# Patient Record
Sex: MALE | Race: WHITE | NOT HISPANIC OR LATINO | Employment: OTHER | URBAN - NONMETROPOLITAN AREA
[De-identification: names, ages, dates, MRNs, and addresses within clinical notes are randomized per-mention and may not be internally consistent; named-entity substitution may affect disease eponyms.]

---

## 2017-01-04 ENCOUNTER — OFFICE VISIT (OUTPATIENT)
Dept: ORTHOPEDIC SURGERY | Facility: CLINIC | Age: 67
End: 2017-01-04

## 2017-01-04 VITALS — BODY MASS INDEX: 30.31 KG/M2 | HEIGHT: 68 IN | RESPIRATION RATE: 16 BRPM | WEIGHT: 200 LBS

## 2017-01-04 DIAGNOSIS — Z87.39 HISTORY OF CALCIUM PYROPHOSPHATE DEPOSITION DISEASE (CPPD): ICD-10-CM

## 2017-01-04 DIAGNOSIS — M17.11 PRIMARY OSTEOARTHRITIS OF RIGHT KNEE: Primary | ICD-10-CM

## 2017-01-04 DIAGNOSIS — M11.261 PSEUDOGOUT OF KNEE, RIGHT: ICD-10-CM

## 2017-01-04 PROCEDURE — 20610 DRAIN/INJ JOINT/BURSA W/O US: CPT | Performed by: ORTHOPAEDIC SURGERY

## 2017-01-04 RX ORDER — METHYLPREDNISOLONE ACETATE 40 MG/ML
40 INJECTION, SUSPENSION INTRA-ARTICULAR; INTRALESIONAL; INTRAMUSCULAR; SOFT TISSUE
Status: COMPLETED | OUTPATIENT
Start: 2017-01-04 | End: 2017-01-04

## 2017-01-04 RX ORDER — LIDOCAINE HYDROCHLORIDE 10 MG/ML
2 INJECTION, SOLUTION INFILTRATION; PERINEURAL
Status: COMPLETED | OUTPATIENT
Start: 2017-01-04 | End: 2017-01-04

## 2017-01-04 RX ADMIN — METHYLPREDNISOLONE ACETATE 40 MG: 40 INJECTION, SUSPENSION INTRA-ARTICULAR; INTRALESIONAL; INTRAMUSCULAR; SOFT TISSUE at 08:43

## 2017-01-04 RX ADMIN — LIDOCAINE HYDROCHLORIDE 2 ML: 10 INJECTION, SOLUTION INFILTRATION; PERINEURAL at 08:43

## 2017-01-04 NOTE — MR AVS SNAPSHOT
Chance Jerry   2017 8:30 AM   Office Visit    Dept Phone:  [phone]   Encounter #:  [id number]    Provider:  Eric Cruz MD   Department:  [facility]                Your Full Care Plan              Your Updated Medication List          This list is accurate as of: 17  9:09 AM.  Always use your most recent med list.                aspirin 81 MG EC tablet       buPROPion  MG 24 hr tablet   Commonly known as:  WELLBUTRIN XL       ibuprofen 200 MG tablet   Commonly known as:  ADVIL,MOTRIN       JANUMET  MG per tablet   Generic drug:  sitaGLIPtin-metFORMIN       LIPITOR PO       LISINOPRIL PO       VITAMIN B-12 PO               We Performed the Following     Large Joint Arthrocentesis       You Were Diagnosed With        Codes Comments    Primary osteoarthritis of right knee    -  Primary ICD-10-CM: M17.11  ICD-9-CM: 715.16     History of calcium pyrophosphate deposition disease (CPPD)     ICD-10-CM: Z87.39  ICD-9-CM: V13.4     Pseudogout of knee, right     ICD-10-CM: M11.861  ICD-9-CM: 275.49, 712.16       Instructions     None    Patient Instructions History      Upcoming Appointments     Visit Type Date Time Department    OFFICE VISIT 2017  8:30 AM [facility]    OFFICE VISIT 2017  8:30 AM [facility]      [email or web address] Signup     [facility] [employer] allows you to send messages to your doctor, view your test results, renew your prescriptions, schedule appointments, and more. To sign up, go to [employer] and click on the Sign Up Now link in the New User? box. Enter your [employer] Activation Code exactly as it appears below along with the last four digits of your Social Security Number and your Date of Birth () to complete the sign-up process. If you do not sign up before the expiration date, you must request a new code.    [employer] Activation Code:  [email or web address]  Expires: 1/18/2017  9:07 AM    If you have questions, you can email [email or web address] or call [phone] to talk to our MyChart staff. Remember, [employer] is NOT to be used for urgent needs. For medical emergencies, dial 911.               Other Info from Your Visit           Your Appointments     Apr 05, 2017  8:30 AM EDT   Office Visit with Eric Cruz MD   [facility] (--)    [address] [place], [place] [address]   [phone]           Arrive 15 minutes prior to appointment.              Allergies     No Known Allergies      Reason for Visit     Left Knee - Follow-up     Right Knee - Follow-up, Pain Requesting cortisone injection for right knee      Vital Signs     Respirations Height Weight Body Mass Index Smoking Status       16 68\" (172.7 cm) 200 lb (90.7 kg) 30.41 kg/m2 Never Smoker       Problems and Diagnoses Noted     Degenerative joint disease of lower leg    -  Primary    History of calcium pyrophosphate deposition disease (CPPD)        Pseudogout of knee, right          Medications Administered     lidocaine (XYLOCAINE) 1 % injection 2 mL                  methylPREDNISolone acetate (DEPO-medrol) injection 40 mg                    Results     Large Joint Arthrocentesis               "

## 2017-04-12 ENCOUNTER — OFFICE VISIT (OUTPATIENT)
Dept: ORTHOPEDIC SURGERY | Facility: CLINIC | Age: 67
End: 2017-04-12

## 2017-04-12 VITALS — RESPIRATION RATE: 18 BRPM | BODY MASS INDEX: 29.86 KG/M2 | HEIGHT: 68 IN | WEIGHT: 197 LBS

## 2017-04-12 DIAGNOSIS — M11.261 PSEUDOGOUT OF KNEE, RIGHT: ICD-10-CM

## 2017-04-12 DIAGNOSIS — M11.262 PSEUDOGOUT OF KNEE, LEFT: ICD-10-CM

## 2017-04-12 DIAGNOSIS — M17.4 OTHER SECONDARY OSTEOARTHRITIS OF BOTH KNEES: Primary | ICD-10-CM

## 2017-04-12 DIAGNOSIS — Z87.39 HISTORY OF CALCIUM PYROPHOSPHATE DEPOSITION DISEASE (CPPD): ICD-10-CM

## 2017-04-12 PROCEDURE — 20610 DRAIN/INJ JOINT/BURSA W/O US: CPT | Performed by: ORTHOPAEDIC SURGERY

## 2017-04-12 RX ORDER — LIDOCAINE HYDROCHLORIDE 10 MG/ML
2 INJECTION, SOLUTION INFILTRATION; PERINEURAL
Status: COMPLETED | OUTPATIENT
Start: 2017-04-12 | End: 2017-04-12

## 2017-04-12 RX ORDER — METHYLPREDNISOLONE ACETATE 40 MG/ML
40 INJECTION, SUSPENSION INTRA-ARTICULAR; INTRALESIONAL; INTRAMUSCULAR; SOFT TISSUE
Status: COMPLETED | OUTPATIENT
Start: 2017-04-12 | End: 2017-04-12

## 2017-04-12 RX ADMIN — LIDOCAINE HYDROCHLORIDE 2 ML: 10 INJECTION, SOLUTION INFILTRATION; PERINEURAL at 14:01

## 2017-04-12 RX ADMIN — METHYLPREDNISOLONE ACETATE 40 MG: 40 INJECTION, SUSPENSION INTRA-ARTICULAR; INTRALESIONAL; INTRAMUSCULAR; SOFT TISSUE at 14:01

## 2017-04-12 NOTE — PROGRESS NOTES
"Subjective   Chance Jerry is a 66 y.o. male is here today for injection therapy.  Pain and Follow-up of the Left Knee and Pain and Follow-up of the Right Knee      History of Present Illness     Past Medical History:   Diagnosis Date   • Diabetes mellitus    • H/O corticosteroid therapy     left knee injection, right shoulder, right knee   • Hypertension    • Osteoarthritis of knee    • Pseudogout of knee         Past Surgical History:   Procedure Laterality Date   • HERNIA REPAIR         No Known Allergies    Review of Systems   Constitutional: Negative for fever.   HENT: Negative for voice change.    Eyes: Negative for visual disturbance.   Respiratory: Negative for shortness of breath.    Cardiovascular: Negative for chest pain.   Gastrointestinal: Negative for abdominal distention and abdominal pain.   Genitourinary: Negative for dysuria.   Musculoskeletal: Positive for arthralgias. Negative for gait problem and joint swelling.   Skin: Negative for rash.   Neurological: Negative for speech difficulty.   Hematological: Does not bruise/bleed easily.   Psychiatric/Behavioral: Negative for confusion.        Objective   Resp 18  Ht 68\" (172.7 cm)  Wt 197 lb (89.4 kg)  BMI 29.95 kg/m2   Physical Exam   Musculoskeletal:        Right knee: He exhibits effusion.   Psychiatric: His speech is normal.     Since last injection, patient notes substantial relief of symptoms until recently.  No adverse effects of prior injection.  Skin exam stable with no erythema, ecchymosis or rash.  No new swelling.  No motor or sensory changes.  Distal pulse intact.  Right Knee Exam     Tenderness   The patient is experiencing tenderness in the medial retinaculum, lateral retinaculum and patella.    Range of Motion   Extension: 5   Flexion: 110     Muscle Strength     The patient has normal right knee strength.    Tests   Abhinav:  Lateral - positive  Drawer:       Anterior - 1+    Posterior - 1+  Varus: positive " (pseudolaxity)  Valgus: negative  Patellar Apprehension: negative    Other   Erythema: absent  Swelling: moderate  Other tests: effusion present      Left Knee Exam     Tenderness   The patient is experiencing no tenderness.         Range of Motion   Extension: 0   Flexion: 130     Muscle Strength     The patient has normal left knee strength.          Neurologic Exam     Mental Status   Attention: normal.   Speech: speech is normal   Level of consciousness: alert  Knowledge: good.     Motor Exam   Overall muscle tone: normal    Gait, Coordination, and Reflexes     Gait  Gait: (right greater than left varus knee gait.)    Left Knee Exam     Muscle Strength   Normal left knee strength    Right Knee Exam     Muscle Strength   Normal right knee strength            Large Joint Arthrocentesis  Date/Time: 4/12/2017 2:01 PM  Consent given by: patient  Site marked: site marked  Timeout: Immediately prior to procedure a time out was called to verify the correct patient, procedure, equipment, support staff and site/side marked as required   Supporting Documentation  Indications: pain   Procedure Details  Location: knee - R knee  Preparation: Patient was prepped and draped in the usual sterile fashion  Needle size: 22 G  Approach: superior  Medications administered: 2 mL lidocaine 1 %; 40 mg methylPREDNISolone acetate 40 MG/ML  Aspirate amount: 40 mL  Aspirate: clear and blood-tinged  Patient tolerance: patient tolerated the procedure well with no immediate complications        Assessment/Plan   Independent Review of Radiographic Studies:    No new imaging done today.  Reviewed at a prior visit.  Laboratory and Other Studies:  No new results reviewed today.   Medical Decision Making:    No complications of procedure and treatments.  Ongoing with residual symptoms.  Continue current management and any additional treatments and workup as outlined in plan.    Chance was seen today for pain, follow-up, pain and  follow-up.    Diagnoses and all orders for this visit:    Other secondary osteoarthritis of both knees  -     Large Joint Arthrocentesis    Pseudogout of knee, right    Pseudogout of knee, left    History of calcium pyrophosphate deposition disease (CPPD)    Other orders  -     Cancel: Large Joint Arthrocentesis      Regular exercise as tolerated  Orthopedic activities reviewed and patient expressed appreciation  Discussion of orthopedic goals  Risk, benefits, and merits of treatment alternatives reviewed with the patient and questions answered  Call or notify for any adverse effect from injection therapy  Ice, heat, and/or modalities as beneficial  Watch for signs and symptoms of infection    Recommendations/Plan:  Exercise, medications, injections, other patient advice, and return appointment as noted.  Brace: No brace was given at today's visit  Referral: No referrals made at today's visit  Test/Studies: No additional studies ordered.  Surgery: No surgery proposed at this visit.  Work/Activity Status: May perform usual activities as tolerated  Patient is encouraged to call or return for any issues or concerns.    Return in about 3 months (around 7/12/2017) for Recheck.  Patient agreeable to call or return sooner for any concerns.

## 2017-07-19 ENCOUNTER — OFFICE VISIT (OUTPATIENT)
Dept: ORTHOPEDIC SURGERY | Facility: CLINIC | Age: 67
End: 2017-07-19

## 2017-07-19 VITALS — RESPIRATION RATE: 18 BRPM | HEIGHT: 68 IN | WEIGHT: 197 LBS | BODY MASS INDEX: 29.86 KG/M2

## 2017-07-19 DIAGNOSIS — M11.20 PSEUDOGOUT: ICD-10-CM

## 2017-07-19 DIAGNOSIS — M25.561 ARTHRALGIA OF BOTH KNEES: ICD-10-CM

## 2017-07-19 DIAGNOSIS — M17.0 PRIMARY OSTEOARTHRITIS OF BOTH KNEES: ICD-10-CM

## 2017-07-19 DIAGNOSIS — M25.562 ARTHRALGIA OF BOTH KNEES: ICD-10-CM

## 2017-07-19 DIAGNOSIS — M19.90 INFLAMMATORY OSTEOARTHRITIS: Primary | ICD-10-CM

## 2017-07-19 PROCEDURE — 73560 X-RAY EXAM OF KNEE 1 OR 2: CPT | Performed by: ORTHOPAEDIC SURGERY

## 2017-07-19 PROCEDURE — 99213 OFFICE O/P EST LOW 20 MIN: CPT | Performed by: ORTHOPAEDIC SURGERY

## 2017-07-24 NOTE — PROGRESS NOTES
Subjective   Patient ID: Chance Jerry is a 66 y.o. male is here today for orthopaedic evaluation of recovery progress with treatment. and is here today for a treatment planning discussion.  Follow-up and Pain of the Left Knee and Follow-up and Pain of the Right Knee       History of Present Illness    Progress Note:  Patient reports that with treatments and since the last visit, overall right knee pain is increased, strength is unchanged, and range of motion is decreasing.  Patient is currently using medication (Ibuprofen).   Physical therapy has been performed at home.  Injection therapy has not recently been effective. as it has been in the past.  Since last visit, patient has been working regular duty. Patient does not  present with recent labs or studies for review.    Past Medical History:   Diagnosis Date   • Diabetes mellitus    • H/O corticosteroid therapy     left knee injection, right shoulder, right knee   • Hypertension    • Osteoarthritis of knee    • Pseudogout of knee         Past Surgical History:   Procedure Laterality Date   • HERNIA REPAIR          Family History   Problem Relation Age of Onset   • Lymphoma Father    • Melanoma Son         Social History     Social History   • Marital status:      Spouse name: N/A   • Number of children: N/A   • Years of education: N/A     Occupational History   • Not on file.     Social History Main Topics   • Smoking status: Never Smoker   • Smokeless tobacco: Not on file   • Alcohol use Yes      Comment: daily   • Drug use: No   • Sexual activity: Defer     Other Topics Concern   • Not on file     Social History Narrative       No Known Allergies    Review of Systems   Constitutional: Negative for fever.   HENT: Negative for voice change.    Eyes: Negative for visual disturbance.   Respiratory: Negative for shortness of breath.    Cardiovascular: Negative for chest pain.   Gastrointestinal: Negative for abdominal distention and abdominal pain.  "  Genitourinary: Negative for dysuria.   Musculoskeletal: Positive for arthralgias. Negative for gait problem and joint swelling.   Skin: Negative for rash.   Neurological: Negative for speech difficulty.   Hematological: Does not bruise/bleed easily.   Psychiatric/Behavioral: Negative for confusion.         Objective   Resp 18  Ht 68\" (172.7 cm)  Wt 197 lb (89.4 kg)  BMI 29.95 kg/m2    Physical Exam  Ortho Exam  Neurologic Exam  Ortho Exam  No acute knee exam changes from recent visit.    Assessment/Plan   Independent Review of Radiographic Studies:    Indication to evaluate right greater than left knee pain and with prior comparison views, shows no acute fracture or disclocation evident.  Indication to evaluate knee joint condition, and compared with prior images, shows evident right more bone-on-bone than left chronic advanced varus tri-compartment osteoarthritis.  Also noted are signs of inflammatory pseudogout or calcium pyrophosphate deposition with stippling calcifications of the meniscal outlines of both knees, and maps out subluxations of both medial menisci medially due the varus compression.  Laboratory and Other Studies:  No new results reviewed today.   Medical Decision Making:    No complications of procedure and treatments.  Limited progress with persistent and/or progressive symptoms.  Continue current management and any additional treatments and workup as outlined in plan.    Procedures  [x]  No procedures were performed in office today.    Chance was seen today for follow-up, pain, follow-up and pain.    Diagnoses and all orders for this visit:    Inflammatory osteoarthritis  -     XR knee 1 or 2 vw bilateral; Future    Pseudogout  -     XR knee 1 or 2 vw bilateral; Future    Primary osteoarthritis of both knees  -     XR knee 1 or 2 vw bilateral; Future    Arthralgia of both knees       Regular exercise as tolerated  Orthopedic activities reviewed and patient expressed appreciation  Discussion " of orthopedic goals  Risk, benefits, and merits of treatment alternatives reviewed with the patient and questions answered  The nature of the proposed surgery reviewed with the patient including risks, benefits, rehabilitation, recovery timeframe, and outcome expectations  Ice, heat, and/or modalities as beneficial  Guided on proper techniques for mobility, strength, agility and/or conditioning exercises    Orthopaedic knee osteoarthritis treatment options reviewed including:  Arthiritis exercises and activity modifications  Knee brace  Medications such as anti-inflammatory, as tolerated, and if no contraindications  Corticosteroid injection were effective though not recently.  Visco supplementation injections though not usually effective for inflammatory knee.  Elective knee arthroscopy or arthroplasty as appropriate  Orthopaedic surgery limitations and risks reviewed    Recommendations/Plan:  Exercise, medications, injections, other patient advice, and return appointment as noted.  Brace: He will use a left knee supportive ligament stabilizing brace that is in good condition over to his right knee as long as the brace is not side specific, or check back with us for a right knee ligament stabilizing brace.  Referral: No referrals made at today's visit  Test/Studies: No additional studies ordered.  Surgery: Surgery proposed at this visit as noted., For intractable painful limiting condition, patient to consider elective surgical options. and Elective right total knee replacement.  Work/Activity Status: May perform usual activities as tolerated and Regular duty  Patient is encouraged to call or return for any issues or concerns.     Return if symptoms worsen or fail to improve.  Patient agreeable to call or return sooner for any concerns.

## 2017-08-16 ENCOUNTER — OFFICE VISIT (OUTPATIENT)
Dept: ORTHOPEDIC SURGERY | Facility: CLINIC | Age: 67
End: 2017-08-16

## 2017-08-16 VITALS — HEIGHT: 68 IN | BODY MASS INDEX: 29.96 KG/M2 | RESPIRATION RATE: 16 BRPM | WEIGHT: 197.7 LBS

## 2017-08-16 DIAGNOSIS — Z01.818 PRE-OP EXAMINATION: ICD-10-CM

## 2017-08-16 DIAGNOSIS — M17.0 PRIMARY OSTEOARTHRITIS OF BOTH KNEES: Primary | ICD-10-CM

## 2017-08-16 DIAGNOSIS — M11.20 PSEUDOGOUT: ICD-10-CM

## 2017-08-16 DIAGNOSIS — Z01.818 PRE-OP TESTING: ICD-10-CM

## 2017-08-16 DIAGNOSIS — M79.10 MYALGIA: ICD-10-CM

## 2017-08-16 DIAGNOSIS — M19.90 INFLAMMATORY OSTEOARTHRITIS: ICD-10-CM

## 2017-08-16 DIAGNOSIS — M25.561 ARTHRALGIA OF RIGHT KNEE: ICD-10-CM

## 2017-08-16 DIAGNOSIS — M79.604 PAIN OF RIGHT LOWER EXTREMITY: ICD-10-CM

## 2017-08-16 PROCEDURE — 99213 OFFICE O/P EST LOW 20 MIN: CPT | Performed by: ORTHOPAEDIC SURGERY

## 2017-08-16 RX ORDER — PREDNISONE 1 MG/1
TABLET ORAL
Refills: 0 | COMMUNITY
Start: 2017-08-02 | End: 2017-09-28

## 2017-08-16 NOTE — PROGRESS NOTES
Subjective   Patient ID: Chance Jerry is a 66 y.o. right hand dominant male is here today for orthopaedic evaluation of recovery progress with treatment. and is here today for a treatment planning discussion.  Follow-up and Pain of the Right Knee (Patient would like to discuss surgery)    History of Present Illness    Progress Note:  Patient reports that with treatments and since the last visit, overall pain is increased, strength is unchanged, and range of motion is unchanged.  Patient is currently using medication (Ibuprofen).   Physical therapy has not been initiated.  Injection therapy has not been effective. as it was in the past.     He has used a knee brace part time for occasional temporary relief of symptoms.  Since last visit, patient has been working regular duty. Patient does not  present with recent labs or studies for review.      Patient and spouse had several questions concerning elective total right knee arthroplasty, aftercare and rehabilitation aspects and these were answered and discussed.  In particular we reviewed that his pre-operative knee mobility is fairly good and this has be shown to be a favorable sign for post-operative range of motion.  We reviewed plans for outpatient physical therapy with a knee replacement protocol after a short hospital stay for the procedure.  We discussed realistic outcome expectations and that he should return to and have improved tolerance for all of his usual activities.  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 Patient wants to proceed with elective right total knee replacement and he has tentatively scheduled on 10/10/17 for surgery.      Past Medical History:   Diagnosis Date   • Diabetes mellitus    • H/O corticosteroid  "therapy     left knee injection, right shoulder, right knee   • Hypertension    • Osteoarthritis of knee    • Pseudogout of knee         Past Surgical History:   Procedure Laterality Date   • HERNIA REPAIR          Family History   Problem Relation Age of Onset   • Lymphoma Father    • Melanoma Son         Social History     Social History   • Marital status:      Spouse name: N/A   • Number of children: N/A   • Years of education: N/A     Occupational History   • Not on file.     Social History Main Topics   • Smoking status: Never Smoker   • Smokeless tobacco: Not on file   • Alcohol use Yes      Comment: daily   • Drug use: No   • Sexual activity: Defer     Other Topics Concern   • Not on file     Social History Narrative       No Known Allergies    Review of Systems      Objective   Resp 16  Ht 68\" (172.7 cm)  Wt 197 lb 11.2 oz (89.7 kg)  BMI 30.06 kg/m2    Physical Exam   Constitutional: He appears well-developed. No distress.   Musculoskeletal: He exhibits deformity (right greater than left knee varus posture).        Right knee: He exhibits effusion (1+).   Neurological: He is alert.   Skin: Skin is warm and dry. No rash noted. No erythema.   Psychiatric: He has a normal mood and affect. His speech is normal and behavior is normal. Judgment and thought content normal.   Vitals reviewed.    Right Knee Exam     Tenderness   The patient is experiencing tenderness in the medial retinaculum, lateral retinaculum and patella.    Range of Motion   Extension: 5   Flexion: 120     Tests   Abhinav:  Medial - negative   Varus: positive  Valgus: negative  Patellar Apprehension: negative    Other   Erythema: absent  Pulse: present  Other tests: effusion (1+) present        Extremity DVT signs are Negative on physical exam with negative Otis sign, with no calf pain and with no palpable cords   Neurologic Exam     Mental Status   Attention: normal.   Speech: speech is normal   Level of consciousness: " alert  Knowledge: good.     Motor Exam   Overall muscle tone: normal    Gait, Coordination, and Reflexes     Gait  Gait: (occasional right antalgic limp.)    Ortho Exam  No acute knee exam changes.    Assessment/Plan   Independent Review of Radiographic Studies:    No new imaging done today.  Reviewed at a prior visit.  (7-19-17)  Laboratory and Other Studies:  No new results reviewed today.   Medical Decision Making:    Persistent and progressive knee pain and physical limitations not responding to prior treatments as detailed above.  Continue current management and any additional treatments and workup as outlined in plan.    Procedures  [x]  No procedures were performed in office today.    Chance was seen today for follow-up and pain.    Diagnoses and all orders for this visit:    Primary osteoarthritis of both knees  -     Case Request; Standing  -     Provide instructions to patient regarding NPO status  -     Obtain informed consent  -     Clorhexidine skin prep  -     CBC and Differential  -     Comprehensive metabolic panel  -     Protime-INR  -     APTT  -     MRSA screen  -     Urinalysis w/Culture if Indicated  -     ECG 12 Lead  -     XR chest 2 vw  -     Follow anesthesia standing orders.; Standing  -     Verify NPO Status; Standing  -     BEST hose- To be placed on patient in pre-op; Standing  -     SCD (sequential compression device)- to be placed on patient in Pre-op; Standing  -     POC Glucose Fingerstick; Standing  -     Notify physician (specify); Standing  -     Insert Peripheral IV; Standing  -     Saline Lock & Maintain IV Access; Standing  -     sodium chloride 0.9 % flush 1-10 mL; Infuse 1-10 mL into a venous catheter As Needed for Line Care.  -     ceFAZolin (ANCEF) 2 g in sodium chloride 0.9 % 100 mL IVPB; Infuse 2 g into a venous catheter On Call to the Operating Room.  -     Tranexamic Acid 897 mg in sodium chloride 0.9 % 250 mL; Infuse 897 mg into a venous catheter On Call to the Operating  Room.  -     Tranexamic Acid 897 mg in sodium chloride 0.9 % 250 mL; Infuse 897 mg into a venous catheter On Call to the Operating Room.  -     famotidine (PEPCID) injection 20 mg; Infuse 2 mL into a venous catheter On Call to the Operating Room.  -     Case Request    Inflammatory osteoarthritis  -     Case Request; Standing  -     Provide instructions to patient regarding NPO status  -     Obtain informed consent  -     Clorhexidine skin prep  -     CBC and Differential  -     Comprehensive metabolic panel  -     Protime-INR  -     APTT  -     MRSA screen  -     Urinalysis w/Culture if Indicated  -     ECG 12 Lead  -     XR chest 2 vw  -     Follow anesthesia standing orders.; Standing  -     Verify NPO Status; Standing  -     BEST hose- To be placed on patient in pre-op; Standing  -     SCD (sequential compression device)- to be placed on patient in Pre-op; Standing  -     POC Glucose Fingerstick; Standing  -     Notify physician (specify); Standing  -     Insert Peripheral IV; Standing  -     Saline Lock & Maintain IV Access; Standing  -     sodium chloride 0.9 % flush 1-10 mL; Infuse 1-10 mL into a venous catheter As Needed for Line Care.  -     ceFAZolin (ANCEF) 2 g in sodium chloride 0.9 % 100 mL IVPB; Infuse 2 g into a venous catheter On Call to the Operating Room.  -     Tranexamic Acid 897 mg in sodium chloride 0.9 % 250 mL; Infuse 897 mg into a venous catheter On Call to the Operating Room.  -     Tranexamic Acid 897 mg in sodium chloride 0.9 % 250 mL; Infuse 897 mg into a venous catheter On Call to the Operating Room.  -     famotidine (PEPCID) injection 20 mg; Infuse 2 mL into a venous catheter On Call to the Operating Room.  -     Case Request    Pseudogout  -     Case Request; Standing  -     Provide instructions to patient regarding NPO status  -     Obtain informed consent  -     Clorhexidine skin prep  -     CBC and Differential  -     Comprehensive metabolic panel  -     Protime-INR  -     APTT  -      MRSA screen  -     Urinalysis w/Culture if Indicated  -     ECG 12 Lead  -     XR chest 2 vw  -     Follow anesthesia standing orders.; Standing  -     Verify NPO Status; Standing  -     BEST hose- To be placed on patient in pre-op; Standing  -     SCD (sequential compression device)- to be placed on patient in Pre-op; Standing  -     POC Glucose Fingerstick; Standing  -     Notify physician (specify); Standing  -     Insert Peripheral IV; Standing  -     Saline Lock & Maintain IV Access; Standing  -     sodium chloride 0.9 % flush 1-10 mL; Infuse 1-10 mL into a venous catheter As Needed for Line Care.  -     ceFAZolin (ANCEF) 2 g in sodium chloride 0.9 % 100 mL IVPB; Infuse 2 g into a venous catheter On Call to the Operating Room.  -     Tranexamic Acid 897 mg in sodium chloride 0.9 % 250 mL; Infuse 897 mg into a venous catheter On Call to the Operating Room.  -     Tranexamic Acid 897 mg in sodium chloride 0.9 % 250 mL; Infuse 897 mg into a venous catheter On Call to the Operating Room.  -     famotidine (PEPCID) injection 20 mg; Infuse 2 mL into a venous catheter On Call to the Operating Room.  -     Case Request    Arthralgia of right knee  -     Case Request; Standing  -     Provide instructions to patient regarding NPO status  -     Obtain informed consent  -     Clorhexidine skin prep  -     CBC and Differential  -     Comprehensive metabolic panel  -     Protime-INR  -     APTT  -     MRSA screen  -     Urinalysis w/Culture if Indicated  -     ECG 12 Lead  -     XR chest 2 vw  -     Follow anesthesia standing orders.; Standing  -     Verify NPO Status; Standing  -     BEST hose- To be placed on patient in pre-op; Standing  -     SCD (sequential compression device)- to be placed on patient in Pre-op; Standing  -     POC Glucose Fingerstick; Standing  -     Notify physician (specify); Standing  -     Insert Peripheral IV; Standing  -     Saline Lock & Maintain IV Access; Standing  -     sodium chloride 0.9 %  flush 1-10 mL; Infuse 1-10 mL into a venous catheter As Needed for Line Care.  -     ceFAZolin (ANCEF) 2 g in sodium chloride 0.9 % 100 mL IVPB; Infuse 2 g into a venous catheter On Call to the Operating Room.  -     Tranexamic Acid 897 mg in sodium chloride 0.9 % 250 mL; Infuse 897 mg into a venous catheter On Call to the Operating Room.  -     Tranexamic Acid 897 mg in sodium chloride 0.9 % 250 mL; Infuse 897 mg into a venous catheter On Call to the Operating Room.  -     famotidine (PEPCID) injection 20 mg; Infuse 2 mL into a venous catheter On Call to the Operating Room.  -     Case Request    Pre-op examination  -     Case Request; Standing  -     Provide instructions to patient regarding NPO status  -     Obtain informed consent  -     Clorhexidine skin prep  -     CBC and Differential  -     Comprehensive metabolic panel  -     Protime-INR  -     APTT  -     MRSA screen  -     Urinalysis w/Culture if Indicated  -     ECG 12 Lead  -     XR chest 2 vw  -     Follow anesthesia standing orders.; Standing  -     Verify NPO Status; Standing  -     BEST hose- To be placed on patient in pre-op; Standing  -     SCD (sequential compression device)- to be placed on patient in Pre-op; Standing  -     POC Glucose Fingerstick; Standing  -     Notify physician (specify); Standing  -     Insert Peripheral IV; Standing  -     Saline Lock & Maintain IV Access; Standing  -     sodium chloride 0.9 % flush 1-10 mL; Infuse 1-10 mL into a venous catheter As Needed for Line Care.  -     ceFAZolin (ANCEF) 2 g in sodium chloride 0.9 % 100 mL IVPB; Infuse 2 g into a venous catheter On Call to the Operating Room.  -     Tranexamic Acid 897 mg in sodium chloride 0.9 % 250 mL; Infuse 897 mg into a venous catheter On Call to the Operating Room.  -     Tranexamic Acid 897 mg in sodium chloride 0.9 % 250 mL; Infuse 897 mg into a venous catheter On Call to the Operating Room.  -     famotidine (PEPCID) injection 20 mg; Infuse 2 mL into a  venous catheter On Call to the Operating Room.  -     Case Request    Pre-op testing  -     Case Request; Standing  -     Provide instructions to patient regarding NPO status  -     Obtain informed consent  -     Clorhexidine skin prep  -     CBC and Differential  -     Comprehensive metabolic panel  -     Protime-INR  -     APTT  -     MRSA screen  -     Urinalysis w/Culture if Indicated  -     ECG 12 Lead  -     XR chest 2 vw  -     Follow anesthesia standing orders.; Standing  -     Verify NPO Status; Standing  -     BEST hose- To be placed on patient in pre-op; Standing  -     SCD (sequential compression device)- to be placed on patient in Pre-op; Standing  -     POC Glucose Fingerstick; Standing  -     Notify physician (specify); Standing  -     Insert Peripheral IV; Standing  -     Saline Lock & Maintain IV Access; Standing  -     sodium chloride 0.9 % flush 1-10 mL; Infuse 1-10 mL into a venous catheter As Needed for Line Care.  -     ceFAZolin (ANCEF) 2 g in sodium chloride 0.9 % 100 mL IVPB; Infuse 2 g into a venous catheter On Call to the Operating Room.  -     Tranexamic Acid 897 mg in sodium chloride 0.9 % 250 mL; Infuse 897 mg into a venous catheter On Call to the Operating Room.  -     Tranexamic Acid 897 mg in sodium chloride 0.9 % 250 mL; Infuse 897 mg into a venous catheter On Call to the Operating Room.  -     famotidine (PEPCID) injection 20 mg; Infuse 2 mL into a venous catheter On Call to the Operating Room.  -     Case Request    Pain of right lower extremity  -     Case Request; Standing  -     Provide instructions to patient regarding NPO status  -     Obtain informed consent  -     Clorhexidine skin prep  -     CBC and Differential  -     Comprehensive metabolic panel  -     Protime-INR  -     APTT  -     MRSA screen  -     Urinalysis w/Culture if Indicated  -     ECG 12 Lead  -     XR chest 2 vw  -     Follow anesthesia standing orders.; Standing  -     Verify NPO Status; Standing  -     BEST  hose- To be placed on patient in pre-op; Standing  -     SCD (sequential compression device)- to be placed on patient in Pre-op; Standing  -     POC Glucose Fingerstick; Standing  -     Notify physician (specify); Standing  -     Insert Peripheral IV; Standing  -     Saline Lock & Maintain IV Access; Standing  -     sodium chloride 0.9 % flush 1-10 mL; Infuse 1-10 mL into a venous catheter As Needed for Line Care.  -     ceFAZolin (ANCEF) 2 g in sodium chloride 0.9 % 100 mL IVPB; Infuse 2 g into a venous catheter On Call to the Operating Room.  -     Tranexamic Acid 897 mg in sodium chloride 0.9 % 250 mL; Infuse 897 mg into a venous catheter On Call to the Operating Room.  -     Tranexamic Acid 897 mg in sodium chloride 0.9 % 250 mL; Infuse 897 mg into a venous catheter On Call to the Operating Room.  -     famotidine (PEPCID) injection 20 mg; Infuse 2 mL into a venous catheter On Call to the Operating Room.  -     Case Request    Myalgia  -     Case Request; Standing  -     Provide instructions to patient regarding NPO status  -     Obtain informed consent  -     Clorhexidine skin prep  -     CBC and Differential  -     Comprehensive metabolic panel  -     Protime-INR  -     APTT  -     MRSA screen  -     Urinalysis w/Culture if Indicated  -     ECG 12 Lead  -     XR chest 2 vw  -     Follow anesthesia standing orders.; Standing  -     Verify NPO Status; Standing  -     BEST hose- To be placed on patient in pre-op; Standing  -     SCD (sequential compression device)- to be placed on patient in Pre-op; Standing  -     POC Glucose Fingerstick; Standing  -     Notify physician (specify); Standing  -     Insert Peripheral IV; Standing  -     Saline Lock & Maintain IV Access; Standing  -     sodium chloride 0.9 % flush 1-10 mL; Infuse 1-10 mL into a venous catheter As Needed for Line Care.  -     ceFAZolin (ANCEF) 2 g in sodium chloride 0.9 % 100 mL IVPB; Infuse 2 g into a venous catheter On Call to the Operating  Room.  -     Tranexamic Acid 897 mg in sodium chloride 0.9 % 250 mL; Infuse 897 mg into a venous catheter On Call to the Operating Room.  -     Tranexamic Acid 897 mg in sodium chloride 0.9 % 250 mL; Infuse 897 mg into a venous catheter On Call to the Operating Room.  -     famotidine (PEPCID) injection 20 mg; Infuse 2 mL into a venous catheter On Call to the Operating Room.  -     Case Request       Regular exercise as tolerated  Orthopedic activities reviewed and patient expressed appreciation  Discussion of orthopedic goals  Risk, benefits, and merits of treatment alternatives reviewed with the patient and questions answered  The nature of the proposed surgery reviewed with the patient including risks, benefits, rehabilitation, recovery timeframe, and outcome expectations  Take prescribed medications as instructed only as tolerated  Ice, heat, and/or modalities as beneficial  After care and dental prophylaxis for joint replacement prosthesis  Guided on proper techniques for mobility, strength, agility and/or conditioning exercises  Counseling given on post-operative expectations and care.    Recommendations/Plan:  Exercise, medications, injections, other patient advice, and return appointment as noted.  Brace: No brace was given at today's visit  Referral: No referrals made at today's visit  Test/Studies: No additional studies ordered.  Surgery: Surgery proposed at this visit as noted.  Work/Activity Status: May perform usual activities as tolerated  Patient is encouraged to call or return for any issues or concerns.     Return in 5 weeks (on 9/18/2017) for Pre-op visit for R TKR, Recheck.  Patient agreeable to call or return sooner for any concerns.

## 2017-08-18 RX ORDER — SODIUM CHLORIDE 0.9 % (FLUSH) 0.9 %
1-10 SYRINGE (ML) INJECTION AS NEEDED
Status: CANCELLED | OUTPATIENT
Start: 2017-08-18

## 2017-09-13 PROBLEM — Z01.818 PRE-OP EXAMINATION: Status: ACTIVE | Noted: 2017-09-13

## 2017-09-13 PROBLEM — M19.90 INFLAMMATORY OSTEOARTHRITIS: Status: ACTIVE | Noted: 2017-09-13

## 2017-09-13 PROBLEM — M17.0 PRIMARY OSTEOARTHRITIS OF BOTH KNEES: Status: ACTIVE | Noted: 2017-09-13

## 2017-09-13 PROBLEM — M25.561 ARTHRALGIA OF RIGHT KNEE: Status: ACTIVE | Noted: 2017-09-13

## 2017-09-13 PROBLEM — Z01.818 PRE-OP TESTING: Status: ACTIVE | Noted: 2017-09-13

## 2017-09-13 PROBLEM — M79.10 MYALGIA: Status: ACTIVE | Noted: 2017-09-13

## 2017-09-13 PROBLEM — M79.604 PAIN OF RIGHT LOWER EXTREMITY: Status: ACTIVE | Noted: 2017-09-13

## 2017-09-13 PROBLEM — M11.20 PSEUDOGOUT: Status: ACTIVE | Noted: 2017-09-13

## 2017-09-28 ENCOUNTER — APPOINTMENT (OUTPATIENT)
Dept: PREADMISSION TESTING | Facility: HOSPITAL | Age: 67
End: 2017-09-28

## 2017-09-28 ENCOUNTER — OFFICE VISIT (OUTPATIENT)
Dept: ORTHOPEDIC SURGERY | Facility: CLINIC | Age: 67
End: 2017-09-28

## 2017-09-28 ENCOUNTER — HOSPITAL ENCOUNTER (OUTPATIENT)
Dept: GENERAL RADIOLOGY | Facility: HOSPITAL | Age: 67
Discharge: HOME | End: 2017-09-28
Admitting: ORTHOPAEDIC SURGERY

## 2017-09-28 VITALS — BODY MASS INDEX: 29.86 KG/M2 | HEIGHT: 68 IN | WEIGHT: 197 LBS | RESPIRATION RATE: 18 BRPM

## 2017-09-28 VITALS
DIASTOLIC BLOOD PRESSURE: 84 MMHG | HEIGHT: 70 IN | BODY MASS INDEX: 27.92 KG/M2 | WEIGHT: 195 LBS | SYSTOLIC BLOOD PRESSURE: 156 MMHG | HEART RATE: 84 BPM

## 2017-09-28 DIAGNOSIS — M25.561 ARTHRALGIA OF RIGHT KNEE: ICD-10-CM

## 2017-09-28 DIAGNOSIS — M11.20 PSEUDOGOUT: ICD-10-CM

## 2017-09-28 DIAGNOSIS — M17.0 PRIMARY OSTEOARTHRITIS OF BOTH KNEES: Primary | ICD-10-CM

## 2017-09-28 DIAGNOSIS — Z01.811 PRE-OP CHEST EXAM: ICD-10-CM

## 2017-09-28 LAB
ALBUMIN SERPL-MCNC: 4.9 G/DL (ref 3.5–5)
ALBUMIN/GLOB SERPL: 1.6 G/DL (ref 1–2)
ALP SERPL-CCNC: 92 U/L (ref 38–126)
ALT SERPL W P-5'-P-CCNC: 50 U/L (ref 13–69)
ANION GAP SERPL CALCULATED.3IONS-SCNC: 17.8 MMOL/L
APTT PPP: 31.2 SECONDS (ref 25–36)
AST SERPL-CCNC: 30 U/L (ref 15–46)
BACTERIA UR QL AUTO: ABNORMAL /HPF
BASOPHILS # BLD AUTO: 0.05 10*3/MM3 (ref 0–0.2)
BASOPHILS NFR BLD AUTO: 0.6 % (ref 0–2.5)
BILIRUB SERPL-MCNC: 1.5 MG/DL (ref 0.2–1.3)
BILIRUB UR QL STRIP: NEGATIVE
BUN BLD-MCNC: 22 MG/DL (ref 7–20)
BUN/CREAT SERPL: 27.5 (ref 6.3–21.9)
CALCIUM SPEC-SCNC: 10.3 MG/DL (ref 8.4–10.2)
CHLORIDE SERPL-SCNC: 103 MMOL/L (ref 98–107)
CLARITY UR: CLEAR
CO2 SERPL-SCNC: 26 MMOL/L (ref 26–30)
COLOR UR: YELLOW
CREAT BLD-MCNC: 0.8 MG/DL (ref 0.6–1.3)
DEPRECATED RDW RBC AUTO: 41.2 FL (ref 37–54)
EOSINOPHIL # BLD AUTO: 0.06 10*3/MM3 (ref 0–0.7)
EOSINOPHIL NFR BLD AUTO: 0.7 % (ref 0–7)
ERYTHROCYTE [DISTWIDTH] IN BLOOD BY AUTOMATED COUNT: 12.5 % (ref 11.5–14.5)
GFR SERPL CREATININE-BSD FRML MDRD: 97 ML/MIN/1.73
GLOBULIN UR ELPH-MCNC: 3 GM/DL
GLUCOSE BLD-MCNC: 95 MG/DL (ref 74–98)
GLUCOSE UR STRIP-MCNC: NEGATIVE MG/DL
HCT VFR BLD AUTO: 45.8 % (ref 42–52)
HGB BLD-MCNC: 15.5 G/DL (ref 14–18)
HGB UR QL STRIP.AUTO: ABNORMAL
HYALINE CASTS UR QL AUTO: ABNORMAL /LPF
IMM GRANULOCYTES # BLD: 0.04 10*3/MM3 (ref 0–0.06)
IMM GRANULOCYTES NFR BLD: 0.5 % (ref 0–0.6)
INR PPP: 1.1 (ref 0.9–1.1)
KETONES UR QL STRIP: NEGATIVE
LEUKOCYTE ESTERASE UR QL STRIP.AUTO: NEGATIVE
LYMPHOCYTES # BLD AUTO: 2.24 10*3/MM3 (ref 0.6–3.4)
LYMPHOCYTES NFR BLD AUTO: 26.8 % (ref 10–50)
MCH RBC QN AUTO: 30.6 PG (ref 27–31)
MCHC RBC AUTO-ENTMCNC: 33.8 G/DL (ref 30–37)
MCV RBC AUTO: 90.5 FL (ref 80–94)
MONOCYTES # BLD AUTO: 1.1 10*3/MM3 (ref 0–0.9)
MONOCYTES NFR BLD AUTO: 13.2 % (ref 0–12)
NEUTROPHILS # BLD AUTO: 4.86 10*3/MM3 (ref 2–6.9)
NEUTROPHILS NFR BLD AUTO: 58.2 % (ref 37–80)
NITRITE UR QL STRIP: NEGATIVE
NRBC BLD MANUAL-RTO: 0 /100 WBC (ref 0–0)
PH UR STRIP.AUTO: <=5 [PH] (ref 5–8)
PLATELET # BLD AUTO: 236 10*3/MM3 (ref 130–400)
PMV BLD AUTO: 10.7 FL (ref 6–12)
POTASSIUM BLD-SCNC: 4.8 MMOL/L (ref 3.5–5.1)
PROT SERPL-MCNC: 7.9 G/DL (ref 6.3–8.2)
PROT UR QL STRIP: NEGATIVE
PROTHROMBIN TIME: 12 SECONDS (ref 9.3–12.1)
RBC # BLD AUTO: 5.06 10*6/MM3 (ref 4.7–6.1)
RBC # UR: ABNORMAL /HPF
REF LAB TEST METHOD: ABNORMAL
SODIUM BLD-SCNC: 142 MMOL/L (ref 137–145)
SP GR UR STRIP: 1.02 (ref 1–1.03)
SQUAMOUS #/AREA URNS HPF: ABNORMAL /HPF
UROBILINOGEN UR QL STRIP: ABNORMAL
WBC NRBC COR # BLD: 8.35 10*3/MM3 (ref 4.8–10.8)
WBC UR QL AUTO: ABNORMAL /HPF

## 2017-09-28 PROCEDURE — 85610 PROTHROMBIN TIME: CPT | Performed by: ORTHOPAEDIC SURGERY

## 2017-09-28 PROCEDURE — 80053 COMPREHEN METABOLIC PANEL: CPT | Performed by: ORTHOPAEDIC SURGERY

## 2017-09-28 PROCEDURE — 81001 URINALYSIS AUTO W/SCOPE: CPT | Performed by: ORTHOPAEDIC SURGERY

## 2017-09-28 PROCEDURE — 85025 COMPLETE CBC W/AUTO DIFF WBC: CPT | Performed by: ORTHOPAEDIC SURGERY

## 2017-09-28 PROCEDURE — 87077 CULTURE AEROBIC IDENTIFY: CPT | Performed by: ORTHOPAEDIC SURGERY

## 2017-09-28 PROCEDURE — 87081 CULTURE SCREEN ONLY: CPT | Performed by: ORTHOPAEDIC SURGERY

## 2017-09-28 PROCEDURE — 85730 THROMBOPLASTIN TIME PARTIAL: CPT | Performed by: ORTHOPAEDIC SURGERY

## 2017-09-28 PROCEDURE — 71020 HC CHEST PA AND LATERAL: CPT

## 2017-09-28 PROCEDURE — 36415 COLL VENOUS BLD VENIPUNCTURE: CPT | Performed by: ORTHOPAEDIC SURGERY

## 2017-09-28 PROCEDURE — S0260 H&P FOR SURGERY: HCPCS | Performed by: PHYSICIAN ASSISTANT

## 2017-09-28 RX ORDER — LISINOPRIL 40 MG/1
TABLET ORAL
Refills: 3 | COMMUNITY
Start: 2017-09-05

## 2017-09-28 RX ORDER — AMLODIPINE BESYLATE 2.5 MG/1
TABLET ORAL
Refills: 6 | COMMUNITY
Start: 2017-09-13

## 2017-09-28 ASSESSMENT — KOOS JR: KOOS JR SCORE: 9

## 2017-09-28 NOTE — PROGRESS NOTES
Boris Jerry is a 66 y.o. right hand dominant maleis here today for a discussion of treatment plans, surgery, and rehabilitation.  Pain and Pre-op Exam of the Right Knee       History of Present Illness      This is a chronic condition.  Patient presents for his preop examination in regards to right total knee replacement.  His date for surgery is scheduled on 10/10/2017.  Patient denies any recent fever, chills or recent illness.  Patient states he has tried numerous conservative measures to treat his right knee OA without relief.  He has tried physical therapy, cortisone injections, ibuprofen with very little relief.        PAST MEDICAL HISTORY:    Past Medical History:   Diagnosis Date   • Anxiety    • Arthritis     IN KNEES   • Cancer     BASAL CELL REMOVED    • Cataract     REPORTS BILATERAL CATARACTS IN EARLY STAGES   • Diabetes mellitus    • H/O corticosteroid therapy     left knee injection, right shoulder, right knee   • Hearing loss in right ear     REPORTS MILD FROM PERFURATED EAR DRUM   • History of exercise stress test     REPORTS APPROXIMATELY 20 YEARS AGO AND THAT ALL WAS WNL'S AT THAT TIME   • Hypertension    • Osteoarthritis of knee    • Pseudogout of knee    • Seasonal allergies    • Wears contact lenses     TO BRING IN GLASSES THE DOS          Current Outpatient Prescriptions:   •  amLODIPine (NORVASC) 2.5 MG tablet, TK 1 T PO QD, Disp: , Rfl: 6  •  aspirin 81 MG EC tablet, Take 81 mg by mouth daily., Disp: , Rfl:   •  Atorvastatin Calcium (LIPITOR PO), Take 20 mg by mouth Every Night., Disp: , Rfl:   •  Cyanocobalamin (VITAMIN B-12 PO), Take 1,000 mcg by mouth Daily., Disp: , Rfl:   •  ibuprofen (ADVIL,MOTRIN) 200 MG tablet, Take 400 mg by mouth Every 6 (Six) Hours As Needed for Mild Pain ., Disp: , Rfl:   •  lisinopril (PRINIVIL,ZESTRIL) 40 MG tablet, TK 1 T PO ONCE DAILY IN THE MORNING, Disp: , Rfl: 3  •  sitaGLIPtin-metFORMIN (JANUMET)  MG per tablet, Take 1 tablet by mouth  "Daily., Disp: , Rfl:     Past Surgical History:   Procedure Laterality Date   • COLONOSCOPY     • HERNIA REPAIR Right     INGUINAL   • SKIN BIOPSY     • VASECTOMY     • WISDOM TOOTH EXTRACTION      REPORTS EXTRACTED X3       Family History   Problem Relation Age of Onset   • Lymphoma Father    • Melanoma Son        Social History     Social History   • Marital status:      Spouse name: N/A   • Number of children: N/A   • Years of education: N/A     Occupational History   • Not on file.     Social History Main Topics   • Smoking status: Former Smoker     Packs/day: 1.00     Years: 5.00     Types: Cigarettes   • Smokeless tobacco: Never Used      Comment: REPORTS QUIT AROUND 1972   • Alcohol use Yes      Comment: REPORTS BOURBON, 3 SHOTS, 4-5 TIMES WEEKLY   • Drug use: No   • Sexual activity: Defer     Other Topics Concern   • Not on file     Social History Narrative        No Known Allergies    Review of Systems   Constitutional: Negative for fever.   HENT: Negative for voice change.    Eyes: Negative for visual disturbance.   Respiratory: Negative for shortness of breath.    Cardiovascular: Negative for chest pain.   Gastrointestinal: Negative for abdominal distention and abdominal pain.   Genitourinary: Negative for dysuria.   Musculoskeletal: Positive for arthralgias. Negative for gait problem and joint swelling.   Skin: Negative for rash.   Neurological: Negative for speech difficulty.   Hematological: Does not bruise/bleed easily.   Psychiatric/Behavioral: Negative for confusion.       PHYSICAL EXAMINATION:       Resp 18  Ht 68\" (172.7 cm)  Wt 197 lb (89.4 kg)  BMI 29.95 kg/m2    GENERAL [x] Well developed  []Ill appearing [x] No acute distress    HEENT [x]No acute changes [x] Normocephalic, atraumatic    NECK [x]Supple  [] No midline tenderness    LUNGS [x]Clear bilaterally [x]No wheezes []Rhonchi [] Rales    HEART [x] Regular rate  [x] Regular rhythm [] Irregular    ABDOMEN [x] Soft [x] Not tender " [x] Not distended [x] Normal sounds    VAS/EXT [x] Normal Pulses []Edema []Cyanosis             SKIN [x] Warm [x]Dry []Pink []Ecchymosis []Cool    NEURO [x] Sensation Intact [x] Motor Intact [x] Pulse intact  [] Dysesthesias:_________  []Weakness:__________    MUSCULOSKELETAL []          Physical Exam   Constitutional: He is oriented to person, place, and time. He appears well-nourished.   Eyes: Conjunctivae are normal.   Neck: No tracheal deviation present.   Cardiovascular: Normal rate.    Pulmonary/Chest: Effort normal. No respiratory distress.   Abdominal: He exhibits no distension.   Musculoskeletal:        Right knee: He exhibits decreased range of motion. Tenderness found. Medial joint line and lateral joint line tenderness noted.   Neurological: He is alert and oriented to person, place, and time.   Vitals reviewed.    Right Knee Exam     Range of Motion   Right knee extension: 4.   Flexion: 100     Tests   Drawer:       Anterior - negative    Posterior - negative  Patellar Apprehension: trace    Other   Erythema: absent  Sensation: normal  Pulse: present          Extremity DVT signs are Negative on physical exam with negative Otis sign, with no calf pain, with no palpable cords, with no increased pain with passive stretch/extension and with no skin tone change   Neurologic Exam     Mental Status   Oriented to person, place, and time.     Right Knee Exam     Tenderness   The patient is experiencing tenderness in the medial joint line, lateral joint line.            DVT Screening: No Yes   Smoker [x] []   Personal/Family History of DVT or PE [x] []   Sedentary Lifestyle [x] []   Overweight [x] []          Previous or Active DVT/PE: NO  Cardiac Stent within 1 year: NO  Embolic/Ischemic stroke within 1 year: NO  Creatinine less than 30ml/min: NO  Congenital Thrombophilia: NO  Hypersensitivity to TXA: NO  Color Blindness: NO  NOTE:  TXA  tranexemic acid summary: THIS PATIENT IS A CANDIDATE FOR IV TXA DURING  SURGERY.    Independent Review of Radiographic Studies:    Reviewed at a prior visit.  Laboratory and Other Studies:  No new results reviewed today.         Risks, benefits, and alternative treatments discussed with the patient: [x] Yes [] No    Risk benefits and merits of the proposed surgery were discussed and the patient's questions were answered risks discussed including and not limited to:  Anesthesia reactions  Blood loss and possible transfusion  Infection  Deep venous thrombosis and pulmonary embolus  Nerve, vascular or tendon injury  Fracture  Deformity  Stiffness  Weakness  Prosthesis and implant issues such as loosening, material wear or dislocation  Skin necrosis  Revision surgery or further treatment  Recurrence of problem and condition     Informed consent: [] Signed  [x] To be obtained at hospital  [] Jayla Escalante was seen today for pain and pre-op exam.    Diagnoses and all orders for this visit:    Primary osteoarthritis of both knees    Arthralgia of right knee    Pseudogout       Recommendations/Plan:    Orthopedic activities reviewed and patient expressed appreciation  Discussion of orthopedic goals  Risk, benefits, and merits of treatment alternatives reviewed with the patient and questions answered  The nature of the proposed surgery reviewed with the patient including risks, benefits, rehabilitation, recovery timeframe, and outcome expectations  After care and dental prophylaxis for joint replacement prosthesis    Surgery: Surgery proposed at this visit as noted., If symptoms and functional limitations persist with current treatment, patient to consider elective surgery. and For intractable painful limiting condition, patient to consider elective surgical options.  Patient is encouraged to call or return for any issues or concerns.    PLANNED SURGICAL PROCEDURE: Elective  RIGHT total knee replacement.

## 2017-10-02 LAB — MRSA SPEC QL CULT: ABNORMAL

## 2017-10-03 ENCOUNTER — TELEPHONE (OUTPATIENT)
Dept: ORTHOPEDIC SURGERY | Facility: CLINIC | Age: 67
End: 2017-10-03

## 2017-10-03 NOTE — TELEPHONE ENCOUNTER
Patient states his PCP called him with +MRSA Nares. He will begin bactroban nasal ointment today Rx by the PCP

## 2017-10-10 ENCOUNTER — ANESTHESIA (OUTPATIENT)
Dept: PERIOP | Facility: HOSPITAL | Age: 67
End: 2017-10-10

## 2017-10-10 ENCOUNTER — HOSPITAL ENCOUNTER (INPATIENT)
Facility: HOSPITAL | Age: 67
LOS: 1 days | Discharge: HOME | End: 2017-10-11
Attending: ORTHOPAEDIC SURGERY | Admitting: ORTHOPAEDIC SURGERY

## 2017-10-10 ENCOUNTER — APPOINTMENT (OUTPATIENT)
Dept: GENERAL RADIOLOGY | Facility: HOSPITAL | Age: 67
End: 2017-10-10

## 2017-10-10 ENCOUNTER — ANESTHESIA EVENT (OUTPATIENT)
Dept: PERIOP | Facility: HOSPITAL | Age: 67
End: 2017-10-10

## 2017-10-10 DIAGNOSIS — M79.604 PAIN OF RIGHT LOWER EXTREMITY: ICD-10-CM

## 2017-10-10 DIAGNOSIS — Z01.818 PRE-OP TESTING: ICD-10-CM

## 2017-10-10 DIAGNOSIS — M17.0 PRIMARY OSTEOARTHRITIS OF BOTH KNEES: ICD-10-CM

## 2017-10-10 DIAGNOSIS — Z01.818 PRE-OP EXAMINATION: ICD-10-CM

## 2017-10-10 DIAGNOSIS — M79.10 MYALGIA: ICD-10-CM

## 2017-10-10 DIAGNOSIS — Z74.09 IMPAIRED MOBILITY AND ADLS: ICD-10-CM

## 2017-10-10 DIAGNOSIS — Z74.09 IMPAIRED FUNCTIONAL MOBILITY, BALANCE, GAIT, AND ENDURANCE: Primary | ICD-10-CM

## 2017-10-10 DIAGNOSIS — Z78.9 IMPAIRED MOBILITY AND ADLS: ICD-10-CM

## 2017-10-10 DIAGNOSIS — Z96.651 S/P TOTAL KNEE REPLACEMENT USING CEMENT, RIGHT: ICD-10-CM

## 2017-10-10 DIAGNOSIS — M19.90 INFLAMMATORY OSTEOARTHRITIS: ICD-10-CM

## 2017-10-10 DIAGNOSIS — M25.561 ARTHRALGIA OF RIGHT KNEE: ICD-10-CM

## 2017-10-10 DIAGNOSIS — M11.20 PSEUDOGOUT: ICD-10-CM

## 2017-10-10 LAB — GLUCOSE BLDC GLUCOMTR-MCNC: 124 MG/DL (ref 70–130)

## 2017-10-10 PROCEDURE — 73560 X-RAY EXAM OF KNEE 1 OR 2: CPT

## 2017-10-10 PROCEDURE — 25010000002 DEXAMETHASONE SODIUM PHOSPHATE 10 MG/ML SOLUTION: Performed by: NURSE ANESTHETIST, CERTIFIED REGISTERED

## 2017-10-10 PROCEDURE — 0SRC0J9 REPLACEMENT OF RIGHT KNEE JOINT WITH SYNTHETIC SUBSTITUTE, CEMENTED, OPEN APPROACH: ICD-10-PCS | Performed by: ORTHOPAEDIC SURGERY

## 2017-10-10 PROCEDURE — 63710000001 INSULIN ASPART PER 5 UNITS: Performed by: NURSE PRACTITIONER

## 2017-10-10 PROCEDURE — C1776 JOINT DEVICE (IMPLANTABLE): HCPCS | Performed by: ORTHOPAEDIC SURGERY

## 2017-10-10 PROCEDURE — 25010000002 VANCOMYCIN PER 500 MG: Performed by: ORTHOPAEDIC SURGERY

## 2017-10-10 PROCEDURE — 97161 PT EVAL LOW COMPLEX 20 MIN: CPT

## 2017-10-10 PROCEDURE — 25010000002 PROPOFOL 1000 MG/100ML EMULSION: Performed by: NURSE ANESTHETIST, CERTIFIED REGISTERED

## 2017-10-10 PROCEDURE — 82962 GLUCOSE BLOOD TEST: CPT

## 2017-10-10 PROCEDURE — 94799 UNLISTED PULMONARY SVC/PX: CPT

## 2017-10-10 PROCEDURE — 25010000002 MORPHINE PER 10 MG: Performed by: ORTHOPAEDIC SURGERY

## 2017-10-10 PROCEDURE — 27447 TOTAL KNEE ARTHROPLASTY: CPT | Performed by: ORTHOPAEDIC SURGERY

## 2017-10-10 PROCEDURE — 88300 SURGICAL PATH GROSS: CPT | Performed by: ORTHOPAEDIC SURGERY

## 2017-10-10 PROCEDURE — 94762 N-INVAS EAR/PLS OXIMTRY CONT: CPT

## 2017-10-10 PROCEDURE — 99221 1ST HOSP IP/OBS SF/LOW 40: CPT | Performed by: NURSE PRACTITIONER

## 2017-10-10 PROCEDURE — C1713 ANCHOR/SCREW BN/BN,TIS/BN: HCPCS | Performed by: ORTHOPAEDIC SURGERY

## 2017-10-10 PROCEDURE — 25010000002 FENTANYL CITRATE (PF) 100 MCG/2ML SOLUTION: Performed by: NURSE ANESTHETIST, CERTIFIED REGISTERED

## 2017-10-10 PROCEDURE — 25010000002 MIDAZOLAM PER 1 MG: Performed by: NURSE ANESTHETIST, CERTIFIED REGISTERED

## 2017-10-10 DEVICE — GENESIS II RESURFACING PATELLAR                                    PROSTHESIS  32MM
Type: IMPLANTABLE DEVICE | Site: KNEE | Status: FUNCTIONAL
Brand: GENESIS II

## 2017-10-10 DEVICE — IMPLANTABLE DEVICE: Type: IMPLANTABLE DEVICE | Site: KNEE | Status: FUNCTIONAL

## 2017-10-10 DEVICE — GENESIS II NON-POROUS TIBIAL                                    BASEPLATE SIZE 6 RIGHT
Type: IMPLANTABLE DEVICE | Site: KNEE | Status: FUNCTIONAL
Brand: GENESIS II

## 2017-10-10 DEVICE — CMT BONE SIMPLEX/P FULL DOSE 10/PK: Type: IMPLANTABLE DEVICE | Site: KNEE | Status: FUNCTIONAL

## 2017-10-10 DEVICE — LEGION CRUCIATE RETAINING OXINIUM                                    FEMORAL SIZE 6 RIGHT
Type: IMPLANTABLE DEVICE | Site: KNEE | Status: FUNCTIONAL
Brand: LEGION

## 2017-10-10 DEVICE — LEGION HIGHLY CROSS LINKED                                    POLYETHYLENE CRUCIATE RETAINING                                    INSERT SIZE 5-6 11MM
Type: IMPLANTABLE DEVICE | Site: KNEE | Status: FUNCTIONAL
Brand: LEGION

## 2017-10-10 RX ORDER — ACETAMINOPHEN 325 MG/1
TABLET ORAL
Status: COMPLETED
Start: 2017-10-10 | End: 2017-10-10

## 2017-10-10 RX ORDER — DEXTROSE MONOHYDRATE 25 G/50ML
25 INJECTION, SOLUTION INTRAVENOUS
Status: DISCONTINUED | OUTPATIENT
Start: 2017-10-10 | End: 2017-10-11 | Stop reason: HOSPADM

## 2017-10-10 RX ORDER — FAMOTIDINE 10 MG/ML
INJECTION, SOLUTION INTRAVENOUS
Status: COMPLETED
Start: 2017-10-10 | End: 2017-10-10

## 2017-10-10 RX ORDER — MEPERIDINE HYDROCHLORIDE 50 MG/ML
50 INJECTION INTRAMUSCULAR; INTRAVENOUS; SUBCUTANEOUS ONCE
Status: DISCONTINUED | OUTPATIENT
Start: 2017-10-10 | End: 2017-10-10 | Stop reason: HOSPADM

## 2017-10-10 RX ORDER — ATORVASTATIN CALCIUM 20 MG/1
20 TABLET, FILM COATED ORAL NIGHTLY
Status: DISCONTINUED | OUTPATIENT
Start: 2017-10-10 | End: 2017-10-11 | Stop reason: HOSPADM

## 2017-10-10 RX ORDER — SODIUM CHLORIDE 0.9 % (FLUSH) 0.9 %
1-10 SYRINGE (ML) INJECTION AS NEEDED
Status: DISCONTINUED | OUTPATIENT
Start: 2017-10-10 | End: 2017-10-10 | Stop reason: HOSPADM

## 2017-10-10 RX ORDER — ONDANSETRON 4 MG/1
4 TABLET, ORALLY DISINTEGRATING ORAL EVERY 6 HOURS PRN
Status: DISCONTINUED | OUTPATIENT
Start: 2017-10-10 | End: 2017-10-11 | Stop reason: HOSPADM

## 2017-10-10 RX ORDER — PROPOFOL 10 MG/ML
INJECTION, EMULSION INTRAVENOUS CONTINUOUS PRN
Status: DISCONTINUED | OUTPATIENT
Start: 2017-10-10 | End: 2017-10-10 | Stop reason: SURG

## 2017-10-10 RX ORDER — MORPHINE SULFATE 4 MG/ML
4 INJECTION, SOLUTION INTRAMUSCULAR; INTRAVENOUS
Status: DISCONTINUED | OUTPATIENT
Start: 2017-10-10 | End: 2017-10-11 | Stop reason: HOSPADM

## 2017-10-10 RX ORDER — ONDANSETRON 2 MG/ML
4 INJECTION INTRAMUSCULAR; INTRAVENOUS EVERY 6 HOURS PRN
Status: DISCONTINUED | OUTPATIENT
Start: 2017-10-10 | End: 2017-10-11 | Stop reason: HOSPADM

## 2017-10-10 RX ORDER — ACETAMINOPHEN 325 MG/1
650 TABLET ORAL ONCE
Status: COMPLETED | OUTPATIENT
Start: 2017-10-10 | End: 2017-10-10

## 2017-10-10 RX ORDER — SODIUM CHLORIDE, SODIUM LACTATE, POTASSIUM CHLORIDE, CALCIUM CHLORIDE 600; 310; 30; 20 MG/100ML; MG/100ML; MG/100ML; MG/100ML
1000 INJECTION, SOLUTION INTRAVENOUS CONTINUOUS PRN
Status: DISCONTINUED | OUTPATIENT
Start: 2017-10-10 | End: 2017-10-10 | Stop reason: HOSPADM

## 2017-10-10 RX ORDER — LISINOPRIL 20 MG/1
40 TABLET ORAL
Status: DISCONTINUED | OUTPATIENT
Start: 2017-10-11 | End: 2017-10-11 | Stop reason: HOSPADM

## 2017-10-10 RX ORDER — OXYCODONE AND ACETAMINOPHEN 10; 325 MG/1; MG/1
1 TABLET ORAL ONCE
Status: COMPLETED | OUTPATIENT
Start: 2017-10-10 | End: 2017-10-10

## 2017-10-10 RX ORDER — NICOTINE POLACRILEX 4 MG
1 LOZENGE BUCCAL
Status: DISCONTINUED | OUTPATIENT
Start: 2017-10-10 | End: 2017-10-11 | Stop reason: HOSPADM

## 2017-10-10 RX ORDER — CELECOXIB 100 MG/1
CAPSULE ORAL
Status: COMPLETED
Start: 2017-10-10 | End: 2017-10-10

## 2017-10-10 RX ORDER — DEXAMETHASONE SODIUM PHOSPHATE 10 MG/ML
INJECTION, SOLUTION INTRAMUSCULAR; INTRAVENOUS
Status: DISPENSED
Start: 2017-10-10 | End: 2017-10-11

## 2017-10-10 RX ORDER — ACETAMINOPHEN 325 MG/1
650 TABLET ORAL EVERY 12 HOURS PRN
Status: DISCONTINUED | OUTPATIENT
Start: 2017-10-10 | End: 2017-10-11 | Stop reason: HOSPADM

## 2017-10-10 RX ORDER — AMLODIPINE BESYLATE 5 MG/1
2.5 TABLET ORAL
Status: DISCONTINUED | OUTPATIENT
Start: 2017-10-11 | End: 2017-10-11 | Stop reason: HOSPADM

## 2017-10-10 RX ORDER — SODIUM CHLORIDE, SODIUM LACTATE, POTASSIUM CHLORIDE, CALCIUM CHLORIDE 600; 310; 30; 20 MG/100ML; MG/100ML; MG/100ML; MG/100ML
INJECTION, SOLUTION INTRAVENOUS CONTINUOUS PRN
Status: DISCONTINUED | OUTPATIENT
Start: 2017-10-10 | End: 2017-10-10 | Stop reason: SURG

## 2017-10-10 RX ORDER — HYDROCODONE BITARTRATE AND ACETAMINOPHEN 7.5; 325 MG/1; MG/1
1 TABLET ORAL EVERY 6 HOURS PRN
Status: DISCONTINUED | OUTPATIENT
Start: 2017-10-10 | End: 2017-10-11

## 2017-10-10 RX ORDER — OXYCODONE AND ACETAMINOPHEN 10; 325 MG/1; MG/1
TABLET ORAL
Status: COMPLETED
Start: 2017-10-10 | End: 2017-10-10

## 2017-10-10 RX ORDER — SODIUM CHLORIDE 0.9 % (FLUSH) 0.9 %
1-10 SYRINGE (ML) INJECTION AS NEEDED
Status: DISCONTINUED | OUTPATIENT
Start: 2017-10-10 | End: 2017-10-11 | Stop reason: HOSPADM

## 2017-10-10 RX ORDER — ONDANSETRON 2 MG/ML
4 INJECTION INTRAMUSCULAR; INTRAVENOUS ONCE
Status: DISCONTINUED | OUTPATIENT
Start: 2017-10-10 | End: 2017-10-10 | Stop reason: HOSPADM

## 2017-10-10 RX ORDER — DOCUSATE SODIUM 100 MG/1
100 CAPSULE, LIQUID FILLED ORAL 2 TIMES DAILY PRN
Status: DISCONTINUED | OUTPATIENT
Start: 2017-10-10 | End: 2017-10-11 | Stop reason: HOSPADM

## 2017-10-10 RX ORDER — GLYBURIDE-METFORMIN HYDROCHLORIDE 5; 500 MG/1; MG/1
1 TABLET ORAL
Status: DISCONTINUED | OUTPATIENT
Start: 2017-10-11 | End: 2017-10-11 | Stop reason: HOSPADM

## 2017-10-10 RX ORDER — ACETAMINOPHEN 325 MG/1
650 TABLET ORAL EVERY 12 HOURS PRN
Status: DISCONTINUED | OUTPATIENT
Start: 2017-10-10 | End: 2017-10-10

## 2017-10-10 RX ORDER — MORPHINE SULFATE 2 MG/ML
2 INJECTION, SOLUTION INTRAMUSCULAR; INTRAVENOUS
Status: DISCONTINUED | OUTPATIENT
Start: 2017-10-10 | End: 2017-10-10 | Stop reason: HOSPADM

## 2017-10-10 RX ORDER — CHOLECALCIFEROL (VITAMIN D3) 125 MCG
1000 CAPSULE ORAL DAILY
Status: DISCONTINUED | OUTPATIENT
Start: 2017-10-11 | End: 2017-10-11 | Stop reason: HOSPADM

## 2017-10-10 RX ORDER — BISACODYL 10 MG
10 SUPPOSITORY, RECTAL RECTAL DAILY PRN
Status: DISCONTINUED | OUTPATIENT
Start: 2017-10-10 | End: 2017-10-11 | Stop reason: HOSPADM

## 2017-10-10 RX ORDER — MORPHINE SULFATE 4 MG/ML
4 INJECTION, SOLUTION INTRAMUSCULAR; INTRAVENOUS
Status: DISCONTINUED | OUTPATIENT
Start: 2017-10-10 | End: 2017-10-10

## 2017-10-10 RX ORDER — ONDANSETRON 4 MG/1
4 TABLET, FILM COATED ORAL EVERY 6 HOURS PRN
Status: DISCONTINUED | OUTPATIENT
Start: 2017-10-10 | End: 2017-10-11 | Stop reason: HOSPADM

## 2017-10-10 RX ORDER — BUPIVACAINE HYDROCHLORIDE 5 MG/ML
INJECTION, SOLUTION EPIDURAL; INTRACAUDAL AS NEEDED
Status: DISCONTINUED | OUTPATIENT
Start: 2017-10-10 | End: 2017-10-10 | Stop reason: SURG

## 2017-10-10 RX ORDER — SODIUM CHLORIDE, SODIUM LACTATE, POTASSIUM CHLORIDE, CALCIUM CHLORIDE 600; 310; 30; 20 MG/100ML; MG/100ML; MG/100ML; MG/100ML
100 INJECTION, SOLUTION INTRAVENOUS CONTINUOUS
Status: DISCONTINUED | OUTPATIENT
Start: 2017-10-10 | End: 2017-10-10

## 2017-10-10 RX ORDER — VANCOMYCIN HYDROCHLORIDE 500 MG/10ML
INJECTION, POWDER, LYOPHILIZED, FOR SOLUTION INTRAVENOUS
Status: DISPENSED
Start: 2017-10-10 | End: 2017-10-10

## 2017-10-10 RX ORDER — SODIUM CHLORIDE 9 MG/ML
100 INJECTION, SOLUTION INTRAVENOUS CONTINUOUS
Status: DISCONTINUED | OUTPATIENT
Start: 2017-10-10 | End: 2017-10-11

## 2017-10-10 RX ORDER — FONDAPARINUX SODIUM 2.5 MG/.5ML
2.5 INJECTION SUBCUTANEOUS
Status: DISCONTINUED | OUTPATIENT
Start: 2017-10-11 | End: 2017-10-11 | Stop reason: HOSPADM

## 2017-10-10 RX ORDER — FENTANYL CITRATE 50 UG/ML
INJECTION, SOLUTION INTRAMUSCULAR; INTRAVENOUS AS NEEDED
Status: DISCONTINUED | OUTPATIENT
Start: 2017-10-10 | End: 2017-10-10 | Stop reason: SURG

## 2017-10-10 RX ORDER — CELECOXIB 100 MG/1
200 CAPSULE ORAL ONCE
Status: COMPLETED | OUTPATIENT
Start: 2017-10-10 | End: 2017-10-10

## 2017-10-10 RX ORDER — MIDAZOLAM HYDROCHLORIDE 1 MG/ML
INJECTION INTRAMUSCULAR; INTRAVENOUS AS NEEDED
Status: DISCONTINUED | OUTPATIENT
Start: 2017-10-10 | End: 2017-10-10 | Stop reason: SURG

## 2017-10-10 RX ORDER — DEXAMETHASONE SODIUM PHOSPHATE 10 MG/ML
INJECTION, SOLUTION INTRAMUSCULAR; INTRAVENOUS AS NEEDED
Status: DISCONTINUED | OUTPATIENT
Start: 2017-10-10 | End: 2017-10-10 | Stop reason: SURG

## 2017-10-10 RX ORDER — NALOXONE HCL 0.4 MG/ML
0.4 VIAL (ML) INJECTION
Status: DISCONTINUED | OUTPATIENT
Start: 2017-10-10 | End: 2017-10-11 | Stop reason: HOSPADM

## 2017-10-10 RX ORDER — ASPIRIN 81 MG/1
81 TABLET ORAL DAILY
Status: DISCONTINUED | OUTPATIENT
Start: 2017-10-11 | End: 2017-10-11 | Stop reason: HOSPADM

## 2017-10-10 RX ADMIN — CEFAZOLIN 1 G: 1 INJECTION, POWDER, FOR SOLUTION INTRAMUSCULAR; INTRAVENOUS; PARENTERAL at 18:45

## 2017-10-10 RX ADMIN — MUPIROCIN: 20 OINTMENT TOPICAL at 16:00

## 2017-10-10 RX ADMIN — SODIUM CHLORIDE, POTASSIUM CHLORIDE, SODIUM LACTATE AND CALCIUM CHLORIDE: 600; 310; 30; 20 INJECTION, SOLUTION INTRAVENOUS at 11:04

## 2017-10-10 RX ADMIN — MORPHINE SULFATE 4 MG: 4 INJECTION INTRAVENOUS at 19:47

## 2017-10-10 RX ADMIN — Medication 8 ML/HR: at 14:53

## 2017-10-10 RX ADMIN — CELECOXIB 200 MG: 100 CAPSULE ORAL at 10:11

## 2017-10-10 RX ADMIN — ACETAMINOPHEN 650 MG: 325 TABLET, FILM COATED ORAL at 10:11

## 2017-10-10 RX ADMIN — ACETAMINOPHEN 650 MG: 325 TABLET ORAL at 10:11

## 2017-10-10 RX ADMIN — TRANEXAMIC ACID 890 MG: 100 INJECTION, SOLUTION INTRAVENOUS at 11:34

## 2017-10-10 RX ADMIN — FAMOTIDINE 20 MG: 10 INJECTION, SOLUTION INTRAVENOUS at 10:01

## 2017-10-10 RX ADMIN — Medication 200 MCG: at 13:02

## 2017-10-10 RX ADMIN — HYDROCODONE BITARTRATE AND ACETAMINOPHEN 1 TABLET: 7.5; 325 TABLET ORAL at 18:44

## 2017-10-10 RX ADMIN — SODIUM CHLORIDE, POTASSIUM CHLORIDE, SODIUM LACTATE AND CALCIUM CHLORIDE: 600; 310; 30; 20 INJECTION, SOLUTION INTRAVENOUS at 13:10

## 2017-10-10 RX ADMIN — BUPIVACAINE HYDROCHLORIDE 6 ML: 5 INJECTION, SOLUTION EPIDURAL; INTRACAUDAL; PERINEURAL at 14:06

## 2017-10-10 RX ADMIN — SODIUM CHLORIDE 100 ML/HR: 9 INJECTION, SOLUTION INTRAVENOUS at 18:47

## 2017-10-10 RX ADMIN — OXYCODONE AND ACETAMINOPHEN 1 TABLET: 10; 325 TABLET ORAL at 10:09

## 2017-10-10 RX ADMIN — PROPOFOL 100 MCG/KG/MIN: 10 INJECTION, EMULSION INTRAVENOUS at 11:22

## 2017-10-10 RX ADMIN — SODIUM CHLORIDE, POTASSIUM CHLORIDE, SODIUM LACTATE AND CALCIUM CHLORIDE 1000 ML: 600; 310; 30; 20 INJECTION, SOLUTION INTRAVENOUS at 10:01

## 2017-10-10 RX ADMIN — DEXAMETHASONE SODIUM PHOSPHATE 10 MG: 10 INJECTION, SOLUTION INTRAMUSCULAR; INTRAVENOUS at 14:06

## 2017-10-10 RX ADMIN — ONDANSETRON 4 MG: 4 TABLET, FILM COATED ORAL at 22:05

## 2017-10-10 RX ADMIN — MORPHINE SULFATE 4 MG: 4 INJECTION INTRAVENOUS at 17:09

## 2017-10-10 RX ADMIN — BUPIVACAINE HYDROCHLORIDE 20 ML: 5 INJECTION, SOLUTION EPIDURAL; INTRACAUDAL; PERINEURAL at 14:00

## 2017-10-10 RX ADMIN — FENTANYL CITRATE 100 MCG: 50 INJECTION, SOLUTION INTRAMUSCULAR; INTRAVENOUS at 11:12

## 2017-10-10 RX ADMIN — INSULIN ASPART 2 UNITS: 100 INJECTION, SOLUTION INTRAVENOUS; SUBCUTANEOUS at 21:50

## 2017-10-10 RX ADMIN — OXYCODONE HYDROCHLORIDE AND ACETAMINOPHEN 1 TABLET: 10; 325 TABLET ORAL at 10:09

## 2017-10-10 RX ADMIN — ATORVASTATIN CALCIUM 20 MG: 20 TABLET, FILM COATED ORAL at 21:55

## 2017-10-10 RX ADMIN — MUPIROCIN: 20 OINTMENT TOPICAL at 21:27

## 2017-10-10 RX ADMIN — VANCOMYCIN HYDROCHLORIDE: 1 INJECTION, POWDER, LYOPHILIZED, FOR SOLUTION INTRAVENOUS at 11:04

## 2017-10-10 RX ADMIN — MIDAZOLAM HYDROCHLORIDE 2 MG: 1 INJECTION, SOLUTION INTRAMUSCULAR; INTRAVENOUS at 11:12

## 2017-10-10 RX ADMIN — MORPHINE SULFATE 4 MG: 4 INJECTION INTRAVENOUS at 22:05

## 2017-10-10 RX ADMIN — TRANEXAMIC ACID 890 MG: 100 INJECTION, SOLUTION INTRAVENOUS at 12:43

## 2017-10-10 NOTE — PLAN OF CARE
Problem: Perioperative Period (Adult)  Intervention: Prevent/Manage DVT/VTE Risk    10/10/17 1432   Support Surgical/Anesthesia Recovery   Venous Thromboembolism Prevent/Manage compression stockings on

## 2017-10-10 NOTE — PLAN OF CARE
Problem: Perioperative Period (Adult)  Goal: Signs and Symptoms of Listed Potential Problems Will be Absent or Manageable (Perioperative Period)  Outcome: Ongoing (interventions implemented as appropriate)    10/10/17 0933   Perioperative Period   Problems Assessed (Perioperative Period) all   Problems Present (Perioperative Period) none

## 2017-10-10 NOTE — PERIOPERATIVE NURSING NOTE
Patient met in room with Peggy Alves RN the primary nurse, gave report short review of report given to Jennifer Shah RN who took phone report on the patient

## 2017-10-10 NOTE — INTERVAL H&P NOTE
H&P reviewed. The patient was examined and there are no changes to the H&P.     MRSA screen positive.  Hibaclens and Bactroban protocol done, and surgical prophylaxis protocol changed from Cefazolin to Vancomycin.    /85 (BP Location: Left arm, Patient Position: Lying)  Pulse 105  Temp 98.5 °F (36.9 °C) (Temporal Artery )   Resp 18  SpO2 99%      Eric Cruz MD  10/10/2017  10:41 AM

## 2017-10-10 NOTE — CONSULTS
[facility] HOSPITALIST   CONSULTATION      Name:  Boris Jerry   Age:  66 y.o.  Sex:  male  :  1950  MRN:  [id number]   Visit Number:  [id number]  Admission Date:  10/10/2017  Date Of Service:  10/10/17  Primary Care Physician:  Paulie Castaneda MD    Consulting Physician:    LIO Lane    Referring Physician:    Dr. Cruz    Reason For Consult:    Medical management    Chief Complaint:   Right knee pain      History Of Presenting Illness:    This is a 66-year-old male with past medical history significant for osteoarthritis, hypertension, and diabetes mellitus resented to Dr. Cruz for evaluation of right knee pain.  According to the patient he has tried injection therapy which has been unsuccessful, analgesics which have shown little to no benefit in helping with his pain.  He had also tried a knee brace which only provided occasional temporary relief of his symptomatology.  At the time of evaluation by Dr. Cruz treatment options including a right total knee replacement were discussed with the patient.  Patient elected to undergo a right total knee replacement.  He was brought into [facility] today where he did undergo a right total knee replacement perform a Dr. Cruz.  Patient tolerated the procedure without any complications.  A consult was requested of the hospitalist service for medical management.      Review Of Systems:     General ROS: Patient denies any fevers, chills or loss of consciousness. Complains of generalized weakness.   Psychological ROS: Denies any hallucinations and delusions.  Ophthalmic ROS: Denies any diplopia or transient loss of vision.  ENT ROS: Denies sore throat, nasal congestion or ear pain.   Allergy and Immunology ROS: Denies rash or itching.  Hematological and Lymphatic ROS: Denies neck swelling or easy bleeding.  Endocrine ROS: Denies any recent unintentional weight gain or loss.  Diabetes  mellitus  Breast ROS: Denies any pain or swelling.  Respiratory ROS: Denies cough or shortness of breath.   Cardiovascular ROS: Denies chest pain or palpitations. No history of exertional chest pain.  High blood pressure  Gastrointestinal ROS: Denies nausea and vomiting. Denies any abdominal pain. No diarrhea.   Genito-Urinary ROS: Denies dysuria or hematuria.  Musculoskeletal ROS: Complains of chronic back pain. No muscle pain. No calf pain. Right knee pain  Neurological ROS: Denies any focal weakness. No loss of consciousness. Denies any numbness. Denies neck pain.   Dermatological ROS: Denies any redness or pruritis.  History of basal cell removed.     Past Medical History:    Past Medical History:   Diagnosis Date   • Anxiety    • Arthritis     IN KNEES   • Cancer     BASAL CELL REMOVED    • Cataract     REPORTS BILATERAL CATARACTS IN EARLY STAGES   • Diabetes mellitus    • H/O corticosteroid therapy     left knee injection, right shoulder, right knee   • Hearing loss in right ear     REPORTS MILD FROM PERFURATED EAR DRUM   • History of exercise stress test     REPORTS APPROXIMATELY 20 YEARS AGO AND THAT ALL WAS WNL'S AT THAT TIME   • Hypertension    • Osteoarthritis of knee    • Pseudogout of knee    • Seasonal allergies    • Wears contact lenses     TO BRING IN GLASSES THE DOS        Past Surgical history:    Past Surgical History:   Procedure Laterality Date   • COLONOSCOPY     • HERNIA REPAIR Right     INGUINAL   • SKIN BIOPSY     • VASECTOMY     • WISDOM TOOTH EXTRACTION      REPORTS EXTRACTED X3       Social History:    Social History     Social History   • Marital status:      Spouse name: N/A   • Number of children: N/A   • Years of education: N/A     Occupational History   • Not on file.     Social History Main Topics   • Smoking status: Former Smoker     Packs/day: 1.00     Years: 5.00     Types: Cigarettes   • Smokeless tobacco: Never Used      Comment: REPORTS QUIT AROUND 1972   • Alcohol use  Yes      Comment: REPORTS BOURBON, 3 SHOTS, 4-5 TIMES WEEKLY   • Drug use: No   • Sexual activity: Defer     Other Topics Concern   • Not on file     Social History Narrative       Family History:    Family History   Problem Relation Age of Onset   • Lymphoma Father    • Melanoma Son        Allergies:      Review of patient's allergies indicates no known allergies.    Home Medications:    Prior to Admission Medications     Prescriptions Last Dose Informant Patient Reported? Taking?    Atorvastatin Calcium (LIPITOR PO) 10/9/2017 Medication Bottle Yes Yes    Take 20 mg by mouth Every Night.    Cyanocobalamin (VITAMIN B-12 PO) 10/9/2017 Self Yes Yes    Take 1,000 mcg by mouth Daily.    lisinopril (PRINIVIL,ZESTRIL) 40 MG tablet 10/10/2017 Self Yes Yes    TK 1 T PO ONCE DAILY IN THE MORNING    mupirocin (BACTROBAN) 2 % ointment 10/9/2017 Self Yes Yes    Apply  topically 3 (Three) Times a Day.    sitaGLIPtin-metFORMIN (JANUMET)  MG per tablet 10/9/2017 Self Yes Yes    Take 1 tablet by mouth Daily.    amLODIPine (NORVASC) 2.5 MG tablet 10/8/2017 Self Yes No    TK 1 T PO QD    aspirin 81 MG EC tablet 10/3/2017 Self Yes No    Take 81 mg by mouth daily.             Hospital Scheduled Meds:      [START ON 10/11/2017] amLODIPine 2.5 mg Oral Q24H   [START ON 10/11/2017] aspirin 81 mg Oral Daily   atorvastatin 20 mg Oral Nightly   ceFAZolin 1 g Intravenous Q8H   dexamethasone sodium phosphate      [START ON 10/11/2017] fondaparinux 2.5 mg Subcutaneous Q24H   [START ON 10/11/2017] glyBURIDE-metFORMIN 1 tablet Oral Daily With Breakfast   [START ON 10/11/2017] lisinopril 40 mg Oral Q24H   mupirocin  Topical TID   vancomycin      [START ON 10/11/2017] vitamin B-12 1,000 mcg Oral Daily         Bupivacaine HCl-NaCl (PF) 4-14 mL/hr Last Rate: 8 mL/hr (10/10/17 1453)   lactated ringers 100 mL/hr         Vital Signs:    Temp:  [97.8 °F (36.6 °C)-98.6 °F (37 °C)] 97.8 °F (36.6 °C)  Heart Rate:  [] 81  Resp:  [12-19] 12  BP:  (121-149)/(71-91) 124/82    There were no vitals filed for this visit.    There is no height or weight on file to calculate BMI.    Physical Exam:    General Appearance:    Alert and cooperative, not in any acute distress.   Head:    Atraumatic and normocephalic, without obvious abnormality.   Eyes:            PERRLA, conjunctivae and sclerae normal, no Icterus. No pallor. Extra-occular movements are within normal limits.   Ears:    Ears appear intact with no abnormalities noted.   Throat:   No oral lesions, no thrush, oral mucosa moist.   Neck:   Supple, trachea midline, no thyromegaly, no carotid bruit.   Back:     No kyphoscoliosis present. No tenderness to palpation,   range of motion normal.   Lungs:     Chest shape is normal. Breath sounds heard bilaterally equally.  No crackles or wheezing. No Pleural rub or bronchial breathing.      Heart:    Normal S1 and S2, no murmur, no gallop, no rub. No JVD   Abdomen:     Normal bowel sounds, no masses, no organomegaly. Soft        non-tender, non-distended, no guarding, no rebound                tenderness   Extremities:   Moves all extremities well, no edema, no cyanosis, no             Clubbing, dressing right knee   Pulses:   Pulses palpable and equal bilaterally   Skin:   No bleeding, bruising or rash, no skin breakdown, pink, warm and dry   Lymph nodes:   No palpable adenopathy   Neurologic:   Cranial nerves 2 - 12 grossly intact, sensation intact, Motor power is normal and equal bilaterally.         Labs:    Lab Results (last 24 hours)     Procedure Component Value Units Date/Time    POC Glucose Fingerstick [[id number]]  (Normal) Collected:  10/10/17 1008    Specimen:  Blood Updated:  10/10/17 1015     Glucose 124 mg/dL       Serial Number: [id number]:  [id number]       Tissue Pathology Exam - Bone, Knee, Right [[id number]] Collected:  10/10/17 1229    Specimen:  Bone from Knee, Right Updated:  10/10/17 1421          Radiology:    Imaging Results (last 72  hours)     Procedure Component Value Units Date/Time    XR Knee 1 or 2 View Right [[id number]] Collected:  10/10/17 1517     Updated:  10/10/17 1519    Narrative:       PROCEDURE: XR KNEE 1 OR 2 VW RIGHT-     HISTORY: Post-op R TKR; M17.0-Bilateral primary osteoarthritis of knee;  M19.90-Unspecified osteoarthritis, unspecified site; M11.20-Other  chondrocalcinosis, unspecified site; M25.561-Pain in right knee;  Z01.818-Encounter for other preprocedural examination; Z01.818-Encounter  for other preprocedural examination; M79.604-Pain in right leg;  M79.1-Myalgia     COMPARISON: July 19, 2017.     FINDINGS:  A 2 view exam demonstrates total right knee arthroplasty.   There are no hardware complications.  The expected postoperative fluid  and gas is seen in the soft tissues.           Impression:       Status post total right knee arthroplasty with no hardware  complications.                 This report was finalized on 10/10/2017 3:17 PM by Germain Parada M.D..          Assessment and Recommendations/Plan:   .  Diabetes mellitus-patient be placed on insulin protocol.  Monitor blood sugars and titrate insulin accordingly    2.  Chronic essential hypertension continue patient on home antihypertensive medications.    3.  Right total knee replacement-patient is on analgesics, DVT prophylaxis, PT OT has been consulted as well as case management for discharge planning    4.  Osteoarthritis      Deyanira Mccartney, LIO  10/10/17  4:06 PM

## 2017-10-10 NOTE — ANESTHESIA PREPROCEDURE EVALUATION
Anesthesia Evaluation     Patient summary reviewed and Nursing notes reviewed   no history of anesthetic complications:  NPO Solid Status: > 8 hours  NPO Liquid Status: > 8 hours     Airway   Mallampati: I  TM distance: >3 FB  Neck ROM: full  no difficulty expected  Dental - normal exam     Pulmonary - negative pulmonary ROS and normal exam   Cardiovascular - normal exam    Rhythm: regular  Rate: normal    (+) hypertension well controlled, hyperlipidemia      Neuro/Psych- negative ROS  GI/Hepatic/Renal/Endo    (+)  diabetes mellitus (fsbs 124) type 2,     Musculoskeletal     Abdominal  - normal exam    Abdomen: soft.  Bowel sounds: normal.   Substance History - negative use     OB/GYN negative ob/gyn ROS         Other   (+) arthritis   history of cancer (SKIN) remission                                Anesthesia Plan    ASA 2     general, regional and spinal   (Risks discussed , including but not limited to:  Headache, itching, n&v, infection, failure, decreased blood pressure, permanent/chronic back pain, nerve damage, paralysis, etc. All questions answered and informed consent obtained.       Adductor Canal onQ cath with ipac vs tibial pnb  Risk vs Benefits discussed with patient to include infection, bleeding at the site, paresthesia, and incomplete analgesia.  )  intravenous induction   Anesthetic plan and risks discussed with patient.

## 2017-10-10 NOTE — PLAN OF CARE
Problem: Perioperative Period (Adult)  Intervention: Monitor/Manage Pain    10/10/17 3244   Safety Interventions   Medication Review/Management medications reviewed

## 2017-10-10 NOTE — PLAN OF CARE
Problem: Perioperative Period (Adult)  Intervention: Promote Normothermia    10/10/17 1432   Cardiac Interventions   Warming Thermoregulation Maintenance skin exposure time minimized

## 2017-10-10 NOTE — PLAN OF CARE
Problem: Perioperative Period (Adult)  Intervention: Monitor/Manage Postoperative Bleeding    10/10/17 1432   Safety Interventions   Bleeding Management dressing monitored

## 2017-10-10 NOTE — PLAN OF CARE
Problem: Anesthesia/Analgesia, Neuraxial (Adult)  Intervention: Prevent/Manage Hypoxia/Hypoxemia    10/10/17 1975   Respiratory Interventions   Airway/Ventilation Management airway patency maintained

## 2017-10-10 NOTE — PLAN OF CARE
Problem: Anesthesia/Analgesia, Neuraxial (Adult)  Intervention: Monitor/Manage Urinary Retention    10/10/17 1207   Genitourinary () Interventions   Urinary Elimination Promotion catheter patency maintained

## 2017-10-10 NOTE — PLAN OF CARE
Problem: Perioperative Period (Adult)  Goal: Signs and Symptoms of Listed Potential Problems Will be Absent or Manageable (Perioperative Period)  Outcome: Ongoing (interventions implemented as appropriate)  Pt meets PACU discharge criteria.    Problem: Anesthesia/Analgesia, Neuraxial (Adult)  Goal: Signs and Symptoms of Listed Potential Problems Will be Absent or Manageable (Anesthesia/Analgesia, Neuraxial)  Outcome: Ongoing (interventions implemented as appropriate)  Pt meets PACU discharge criteria.

## 2017-10-10 NOTE — PLAN OF CARE
Problem: Perioperative Period (Adult)  Intervention: Promote Pulmonary Hygiene and Secretion Clearance    10/10/17 1077   Positioning   Head Of Bed (HOB) Position HOB at 20-30 degrees

## 2017-10-10 NOTE — ANESTHESIA POSTPROCEDURE EVALUATION
Patient: Boris Jerry    Procedure Summary     Date Anesthesia Start Anesthesia Stop Room / Location    10/10/17 1104 [id number]  [facility] /  [facility]       Procedure Diagnosis Surgeon Provider    ELECTIVE RIGHT TOTAL KNEE ARTHROPLASTY (S&N) (Right Knee) Pseudogout; Myalgia; Pre-op testing; Pre-op examination; Primary osteoarthritis of both knees; Inflammatory osteoarthritis; Arthralgia of right knee; Pain of right lower extremity  (Pseudogout [M11.20]; Myalgia [M79.1]; Pre-op testing [Z01.818]; Pre-op examination [Z01.818]; Primary osteoarthritis of both knees [M17.0]; Inflammatory osteoarthritis [M19.90]; Arthralgia of right knee [M25.561]; Pain of right lower extremity [M79.604]) MD Rizwan Gibbs CRNA          Anesthesia Type: general, regional  Last vitals  BP   127/81 (10/10/17 1355)    Temp   98.6 °F (37 °C) (10/10/17 1344)    Pulse   85 (10/10/17 1355)   Resp   16 (10/10/17 1355)    SpO2   100 % (10/10/17 1355)      Post Anesthesia Care and Evaluation    Patient location during evaluation: PACU  Patient participation: complete - patient participated  Level of consciousness: awake  Pain score: 0  Pain management: adequate  Airway patency: patent  Anesthetic complications: No anesthetic complications  PONV Status: controlled  Cardiovascular status: acceptable and stable  Respiratory status: acceptable and room air  Hydration status: acceptable

## 2017-10-10 NOTE — ANESTHESIA PROCEDURE NOTES
Spinal Block    Patient location during procedure: OR  Indication:at surgeon's request, post-op pain management and procedure for pain  Performed By  CRNA: LARISA LAO  Preanesthetic Checklist  Completed: patient identified, site marked, surgical consent, pre-op evaluation, timeout performed, IV checked, risks and benefits discussed and monitors and equipment checked  Spinal Block Prep:  Patient Position:sitting  Sterile Tech:cap, gloves, mask and sterile barriers  Prep:Chloraprep  Patient Monitoring:blood pressure monitoring, continuous pulse oximetry and EKG  Spinal Block Procedure  Approach:midline  Guidance:landmark technique and palpation technique  Location:L3-L4  Needle Type:Pencan  Needle Gauge:25 G  Placement of Spinal needle event:cerebrospinal fluid aspirated  Paresthesia: no  Fluid Appearance:clear  Post Assessment  Patient Tolerance:patient tolerated the procedure well with no apparent complications  Complications no  Additional Notes  Risks discussed , including but not limited to:  Headache, itching, n&v, infection, failure, decreased bp. Permanent chronic back pain, nerve damage, paralysis, etc.

## 2017-10-10 NOTE — PLAN OF CARE
Problem: Perioperative Period (Adult)  Intervention: Promote Effective Elimination    10/10/17 1432   Genitourinary () Interventions   Urinary Elimination Promotion catheter patency maintained

## 2017-10-10 NOTE — PLAN OF CARE
Problem: Anesthesia/Analgesia, Neuraxial (Adult)  Intervention: Monitor/Manage Cardiac Rate/Rhythm Changes    10/10/17 1429   Cardiac Interventions   Dysrhythmia Management forceful cough encouraged

## 2017-10-10 NOTE — ANESTHESIA PROCEDURE NOTES
Peripheral Block    Start time: 10/10/2017 2:00 PM  Stop time: 10/10/2017 2:06 PM  Reason for block: at surgeon's request and post-op pain management  Performed by  CRNA: HOPE AVILA  Preanesthetic Checklist  Completed: patient identified, site marked, surgical consent, pre-op evaluation, timeout performed, IV checked, risks and benefits discussed and monitors and equipment checked  Prep:  Pt Position: supine  Sterile barriers:cap, gloves, mask and sterile barriers  Prep: ChloraPrep  Patient monitoring: blood pressure monitoring, continuous pulse oximetry and EKG  Procedure  Sedation:yes    Guidance:ultrasound guided  Nerve locations: selectiv tibial. Images:still images obtained    Anesthesia block type: selectiv tibial.  Injection Technique:single-shot  Needle Type:echogenic  Needle Gauge:20 G  Resistance on Injection: less than 15 psi  Medications  Comment:Adjuncts per total volume of LA:    Decadron 10 mg PSF    Local Injected:bupivacaine 0.5% Local Amount Injected:6mL  Post Assessment  Injection Assessment: negative aspiration for heme, no paresthesia on injection and incremental injection  Patient Tolerance:comfortable throughout block  Complications:no  Additional Notes    ++++++++++++++++++++++++++++++++++    Procedure:                  SINGLE SHOT POPLITEAL                                         Patient analgesia was achieved with IV Sedation( see meds)       The pt was placed in a Supine with Leg Elevated position.  The Insertion site was  prepped and draped in sterile fashion. Skin and subcutaneous tissue was infiltrated and anesthetized with 1% Lidocaine via a 25g needle.  A BBraun echogenic needle was then  inserted approximately 3 cm proximal to the popliteal fossa at the lateral mid biceps femoris and advanced In-plane with Ultrasound guidance. The popliteal artery was visualized and the common peroneal and tibial bifurcation was located.  LA injection spread was visualized, injection  was incremental 1-5 ml; injection pressure was normal or little, no intraneural injection, no vascular injection.

## 2017-10-10 NOTE — OP NOTE
[facility] [address], [address]  [place]  [address]   [phone]      OPERATIVE REPORT      PATIENT NAME:  Boris Jerry                            YOB: 1950                     PREOP DIAGNOSIS: Right knee advanced degenerative joint disease    POSTOP DIAGNOSIS: Same.    PROCEDURE:  Right total knee replacement    SURGEON:   Eric Cruz M.D.    FIRST ASSIST:   Circulator: Brock Rg RN; Debbie Vasques RN  Scrub Person: Dave Delgado; Yulia Bowers; Eldon Argueta    ANESTHETIST:  CRNA: Venancio Fernandez CRNA; Rizwan Lockhart CRNA    ANESTHESIA:   Choice    FLUIDS:   1800 ml crystalloid    ESTIM BLOOD LOSS: 150 ml    SPECIMENS:   Knee bone cuts sent to Pathology.    DRAINS:   Sahu to gravity.    TOURNIQUET TIME:  54 min @ 275mm Hg    HARDWARE: A [employer] cemented cruciate retaining total knee replacement with a size # 6 oxinium femoral component, a size # 6 tibial component, 11 mm CR standard polyethylene liner, and a 32 x 9 mm polyethylene patella    FINDINGS:                              End-stage DJD, osteophytes, erosions, cysts, bone-on-bone wear, three compartment involvement, significant varus deformity, extensor lag and ligament imbalance.    COMPLICATIONS:  None    DISPOSITION:  Stable to recovery.     INDICATIONS AND NARRATIVE:  Patient present for elective knee arthroplasty to address painful intractable advanced degenerative joint disease with knee swelling, stiffness, deformity, instability and dysfunction.  The patient presents for planned elective total knee replacement.  Risks and benefits of the surgery were discussed and informed consent was obtained with the patient.  Risks discussed including but not limited to anesthesia, infection, nerve/vessel/tendon injury, fracture, prosthetic loosening or wear, blood loss and possible need for transfusion, DVT and pulmonary embolus.  Goals include pain relief, improved  knee alignment, improved knee range of motion and better function for ambulation and activities.      Antibiotic prophylaxis was given.  Tranexamic acid protocol followed.  Surgeon site marking and a time out were performed prior to the procedure.  Anesthesia was effective and well tolerated.  The knee was prepped and draped in the usual sterile fashion.  A padded thigh tourniquet was placed for the procedure.    After sterile prep and drape, an anterior incision was made at the knee.  A medial para-patella arthrotomy was made and dissection continued proximally along the medial one-third of the quadriceps tendon and distally along the medial border of the patella tendon.  A partial inferior patella fat pad release was performed as well as a superior synovectomy.   Next, steps were taken to prepare the femoral, tibial and patella surfaces for the knee replacement implant.  Care was taken to incorporate 4 degrees of valgus and 3 degrees of external rotation in the femoral cut, neutral varus/valgus, neutral to slight external rotation and 3-5 degrees of posterior slope of the tibial cut, and the patella cut prepared to restore the original thickness with implants.  Equal flexion and extension gaps were achieved.  The minimally invasive instruments, sizing templates, cutting blocks, and guides were used together with radiographic x-ray templates.  The knee trial components achieved good alignment, fit, motion and stability. Soft tissue balancing was achieved in flexion and extension. The bone surfaces were thoroughly irrigated with antibiotic pulsed irrigation.  The entry to the femoral canal was closed with a bone plug.      Next, using standard cement technique including vacuum mixing, centrifugation and pressurization, the actual prosthetic components as described above were cemented in place.  Again, a trial liner was placed to check for the best range of motion and stability of the knee.  The trial was removed and  the actual polyethylene liner was placed onto the tibial tray and secured with the locking mechanism.  The knee was again taken through a final range of motion, alignment and stability check which was excellent.  There was also excellent patella tracking through the entire range of motion. The knee and leg showed good alignment.  The tourniquet was taken down and there was good hemostasis.  Minor venous bleeding was controlled with electrocautery.  The wound was irrigated copiously again as well as throughout the procedure and during closure with pulse lavage antibiotic irrigation.  Routine closure was performed consisting of running barbed absorbable suture for the extensor mechanism, 2-0 Vicryl for deep and superficial subcutaneous layers and running barbed subcuticular suture for the skin.  A sterile Dermabond and Covaderm dressing was applied.  Anesthesia was effective and well tolerated.  There were no complications with the procedure.  The patient was transferred stable to recovery.      Eric Cruz MD  10/10/2017  4:12 PM

## 2017-10-10 NOTE — PLAN OF CARE
Problem: Anesthesia/Analgesia, Neuraxial (Adult)  Intervention: Implement Fall Risk Prevention Measures    10/10/17 1430   Restraint Interventions   Safety Promotion/Fall Prevention fall prevention program maintained       Intervention: Provide Comfort Measures Related to Nausea/Vomiting    10/10/17 1430   Monitor/Manage Hypovolemia   Nausea/Vomiting Interventions slow, deep breathing encouraged

## 2017-10-10 NOTE — ANESTHESIA POSTPROCEDURE EVALUATION
Patient: Boris Jerry    Procedure Summary     Date Anesthesia Start Anesthesia Stop Room / Location    10/10/17 1104 1803  [facility] /  [facility]       Procedure Diagnosis Surgeon Provider    ELECTIVE RIGHT TOTAL KNEE ARTHROPLASTY (S&N) (Right Knee) Pseudogout; Myalgia; Pre-op testing; Pre-op examination; Primary osteoarthritis of both knees; Inflammatory osteoarthritis; Arthralgia of right knee; Pain of right lower extremity  (Pseudogout [M11.20]; Myalgia [M79.1]; Pre-op testing [Z01.818]; Pre-op examination [Z01.818]; Primary osteoarthritis of both knees [M17.0]; Inflammatory osteoarthritis [M19.90]; Arthralgia of right knee [M25.561]; Pain of right lower extremity [M79.604]) MD Rizwan iGbbs CRNA          Anesthesia Type: general, regional  Last vitals  BP   127/81 (10/10/17 1355)    Temp   98.6 °F (37 °C) (10/10/17 1344)    Pulse   85 (10/10/17 1355)   Resp   16 (10/10/17 1355)    SpO2   100 % (10/10/17 1355)      Post Anesthesia Care and Evaluation    Patient location during evaluation: PACU  Patient participation: complete - patient participated  Level of consciousness: awake  Pain score: 0  Pain management: adequate  Airway patency: patent  Anesthetic complications: No anesthetic complications  PONV Status: controlled  Cardiovascular status: acceptable and stable  Respiratory status: acceptable and room air  Hydration status: acceptable

## 2017-10-10 NOTE — ANESTHESIA PROCEDURE NOTES
Peripheral Block    Start time: 10/10/2017 1:50 PM  Stop time: 10/10/2017 2:00 PM  Reason for block: at surgeon's request and post-op pain management  Performed by  CRNA: HOPE AVILA  Preanesthetic Checklist  Completed: patient identified, site marked, surgical consent, pre-op evaluation, timeout performed, IV checked, risks and benefits discussed and monitors and equipment checked  Prep:  Sterile barriers:cap, gloves, mask and sterile barriers  Prep: ChloraPrep  Patient monitoring: blood pressure monitoring, continuous pulse oximetry and EKG  Procedure  Sedation:yes    Guidance:ultrasound guided  ULTRASOUND INTERPRETATION. Using ultrasound guidance a gauge needle was placed in close proximity to the femoral nerve, at which point, under ultrasound guidance anesthetic was injected in the area of the nerve and spread of the anesthesia was seen on ultrasound in close proximity thereto.  There were no abnormalities seen on ultrasound; a digital image was taken; and the patient tolerated the procedure with no complications. Images:still images obtained    Block Type:adductor canal block  Injection Technique:catheter  Needle Type:echogenic  Needle Gauge:18 G    Catheter size: 21.  Cath Depth at skin: 8 cm  Medications  Comment:.  Local Injected:bupivacaine 0.5% Local Amount Injected:20mL  Post Assessment  Injection Assessment: negative aspiration for heme, no paresthesia on injection and incremental injection  Patient Tolerance:comfortable throughout block  Complications:no  Additional Notes  Procedure:          CATHETER ADDUCTOR CANAL BLOCK                                                             CATHETER at skin: 8                                Patient analgesia was achieved with IV Sedation( see meds)       The pt was placed in the Supine position.  The Insertion site was  prepped and Draped in sterile fashion.  A  I-Flow 18g echogenic needle was then  inserted approximately midline, mid-thigh and  advanced In-plane with Ultrasound guidance.  Normal Saline PSF was utilized for hydrodissection of tissue.  The Vastus medialis and Sartorius muscle where visualized and the needle tip was placed in the adductor canal,  lateral to the femoral artery.  LA injection spread was visualized, injection was incremental 1-5 ml, injection pressure was normal or little; no intraneural injection; no vascular injection.  LA dose was injected thru the needle (see dose above).  I-flow 20g catheter was placed via the needle with ultrasound visualization and confirmation with NS fluid bolus or air injection. The needle was then removed and the skin was sealed with Skin Affiix at catheter insertion site.  Skin was prepped with benzoin or mastisol and the labeled curled catheter was secured with steristrips and a transparent dressing.    +++++++++++++++++++++++++++++++++++++++++

## 2017-10-11 VITALS
HEART RATE: 94 BPM | DIASTOLIC BLOOD PRESSURE: 80 MMHG | HEIGHT: 70 IN | BODY MASS INDEX: 30.29 KG/M2 | WEIGHT: 211.6 LBS | TEMPERATURE: 98.3 F | SYSTOLIC BLOOD PRESSURE: 139 MMHG | RESPIRATION RATE: 18 BRPM | OXYGEN SATURATION: 96 %

## 2017-10-11 LAB
ANION GAP SERPL CALCULATED.3IONS-SCNC: 12.6 MMOL/L
BASOPHILS # BLD AUTO: 0.03 10*3/MM3 (ref 0–0.2)
BASOPHILS NFR BLD AUTO: 0.2 % (ref 0–2.5)
BUN BLD-MCNC: 15 MG/DL (ref 7–20)
BUN/CREAT SERPL: 18.8 (ref 6.3–21.9)
CALCIUM SPEC-SCNC: 9.3 MG/DL (ref 8.4–10.2)
CHLORIDE SERPL-SCNC: 106 MMOL/L (ref 98–107)
CO2 SERPL-SCNC: 24 MMOL/L (ref 26–30)
CREAT BLD-MCNC: 0.8 MG/DL (ref 0.6–1.3)
DEPRECATED RDW RBC AUTO: 37.9 FL (ref 37–54)
EOSINOPHIL # BLD AUTO: 0 10*3/MM3 (ref 0–0.7)
EOSINOPHIL NFR BLD AUTO: 0 % (ref 0–7)
ERYTHROCYTE [DISTWIDTH] IN BLOOD BY AUTOMATED COUNT: 11.7 % (ref 11.5–14.5)
GFR SERPL CREATININE-BSD FRML MDRD: 97 ML/MIN/1.73
GLUCOSE BLD-MCNC: 168 MG/DL (ref 74–98)
GLUCOSE BLDC GLUCOMTR-MCNC: 170 MG/DL (ref 70–130)
HBA1C MFR BLD: 6.1 % (ref 3–6)
HCT VFR BLD AUTO: 38.2 % (ref 42–52)
HGB BLD-MCNC: 13.3 G/DL (ref 14–18)
IMM GRANULOCYTES # BLD: 0.11 10*3/MM3 (ref 0–0.06)
IMM GRANULOCYTES NFR BLD: 0.7 % (ref 0–0.6)
LYMPHOCYTES # BLD AUTO: 0.85 10*3/MM3 (ref 0.6–3.4)
LYMPHOCYTES NFR BLD AUTO: 5.7 % (ref 10–50)
MCH RBC QN AUTO: 30.6 PG (ref 27–31)
MCHC RBC AUTO-ENTMCNC: 34.8 G/DL (ref 30–37)
MCV RBC AUTO: 88 FL (ref 80–94)
MONOCYTES # BLD AUTO: 1.27 10*3/MM3 (ref 0–0.9)
MONOCYTES NFR BLD AUTO: 8.5 % (ref 0–12)
NEUTROPHILS # BLD AUTO: 12.61 10*3/MM3 (ref 2–6.9)
NEUTROPHILS NFR BLD AUTO: 84.9 % (ref 37–80)
NRBC BLD MANUAL-RTO: 0 /100 WBC (ref 0–0)
PLATELET # BLD AUTO: 221 10*3/MM3 (ref 130–400)
PMV BLD AUTO: 10.2 FL (ref 6–12)
POTASSIUM BLD-SCNC: 4.6 MMOL/L (ref 3.5–5.1)
RBC # BLD AUTO: 4.34 10*6/MM3 (ref 4.7–6.1)
SODIUM BLD-SCNC: 138 MMOL/L (ref 137–145)
WBC NRBC COR # BLD: 14.87 10*3/MM3 (ref 4.8–10.8)

## 2017-10-11 PROCEDURE — 85025 COMPLETE CBC W/AUTO DIFF WBC: CPT | Performed by: ORTHOPAEDIC SURGERY

## 2017-10-11 PROCEDURE — 82962 GLUCOSE BLOOD TEST: CPT

## 2017-10-11 PROCEDURE — 63710000001 INSULIN ASPART PER 5 UNITS: Performed by: NURSE PRACTITIONER

## 2017-10-11 PROCEDURE — 83036 HEMOGLOBIN GLYCOSYLATED A1C: CPT | Performed by: ORTHOPAEDIC SURGERY

## 2017-10-11 PROCEDURE — 80048 BASIC METABOLIC PNL TOTAL CA: CPT | Performed by: ORTHOPAEDIC SURGERY

## 2017-10-11 PROCEDURE — 99232 SBSQ HOSP IP/OBS MODERATE 35: CPT | Performed by: NURSE PRACTITIONER

## 2017-10-11 PROCEDURE — 97116 GAIT TRAINING THERAPY: CPT

## 2017-10-11 PROCEDURE — 99024 POSTOP FOLLOW-UP VISIT: CPT | Performed by: PHYSICIAN ASSISTANT

## 2017-10-11 PROCEDURE — 97110 THERAPEUTIC EXERCISES: CPT

## 2017-10-11 PROCEDURE — 25010000002 FONDAPARINUX PER 0.5 MG: Performed by: ORTHOPAEDIC SURGERY

## 2017-10-11 PROCEDURE — 97530 THERAPEUTIC ACTIVITIES: CPT

## 2017-10-11 PROCEDURE — 97165 OT EVAL LOW COMPLEX 30 MIN: CPT

## 2017-10-11 RX ORDER — HYDROCODONE BITARTRATE AND ACETAMINOPHEN 10; 325 MG/1; MG/1
1 TABLET ORAL EVERY 6 HOURS PRN
Qty: 40 TABLET | Refills: 0 | Status: SHIPPED | OUTPATIENT
Start: 2017-10-11 | End: 2017-11-10 | Stop reason: SDUPTHER

## 2017-10-11 RX ORDER — HYDROCODONE BITARTRATE AND ACETAMINOPHEN 7.5; 325 MG/1; MG/1
1 TABLET ORAL EVERY 4 HOURS PRN
Status: DISCONTINUED | OUTPATIENT
Start: 2017-10-11 | End: 2017-10-11 | Stop reason: HOSPADM

## 2017-10-11 RX ADMIN — HYDROCODONE BITARTRATE AND ACETAMINOPHEN 1 TABLET: 7.5; 325 TABLET ORAL at 01:26

## 2017-10-11 RX ADMIN — HYDROCODONE BITARTRATE AND ACETAMINOPHEN 1 TABLET: 7.5; 325 TABLET ORAL at 07:52

## 2017-10-11 RX ADMIN — DOCUSATE SODIUM 100 MG: 100 CAPSULE, LIQUID FILLED ORAL at 08:51

## 2017-10-11 RX ADMIN — INSULIN ASPART 2 UNITS: 100 INJECTION, SOLUTION INTRAVENOUS; SUBCUTANEOUS at 12:01

## 2017-10-11 RX ADMIN — HYDROCODONE BITARTRATE AND ACETAMINOPHEN 1 TABLET: 7.5; 325 TABLET ORAL at 12:01

## 2017-10-11 RX ADMIN — CEFAZOLIN 1 G: 1 INJECTION, POWDER, FOR SOLUTION INTRAMUSCULAR; INTRAVENOUS; PARENTERAL at 01:27

## 2017-10-11 RX ADMIN — MUPIROCIN: 20 OINTMENT TOPICAL at 08:52

## 2017-10-11 RX ADMIN — ASPIRIN 81 MG: 81 TABLET, COATED ORAL at 08:50

## 2017-10-11 RX ADMIN — CYANOCOBALAMIN TAB 500 MCG 1000 MCG: 500 TAB at 08:49

## 2017-10-11 RX ADMIN — HYDROCODONE BITARTRATE AND ACETAMINOPHEN 1 TABLET: 7.5; 325 TABLET ORAL at 16:24

## 2017-10-11 RX ADMIN — LISINOPRIL 40 MG: 20 TABLET ORAL at 08:50

## 2017-10-11 RX ADMIN — FONDAPARINUX SODIUM 2.5 MG: 2.5 INJECTION, SOLUTION SUBCUTANEOUS at 08:51

## 2017-10-11 NOTE — PLAN OF CARE
Problem: Patient Care Overview (Adult)  Goal: Plan of Care Review  Outcome: Ongoing (interventions implemented as appropriate)    10/10/17 2044   Coping/Psychosocial Response Interventions   Plan Of Care Reviewed With patient       Goal: Adult Individualization and Mutuality  Outcome: Ongoing (interventions implemented as appropriate)  Goal: Discharge Needs Assessment  Outcome: Ongoing (interventions implemented as appropriate)    Problem: Perioperative Period (Adult)  Goal: Signs and Symptoms of Listed Potential Problems Will be Absent or Manageable (Perioperative Period)  Outcome: Ongoing (interventions implemented as appropriate)    Problem: Anesthesia/Analgesia, Neuraxial (Adult)  Goal: Signs and Symptoms of Listed Potential Problems Will be Absent or Manageable (Anesthesia/Analgesia, Neuraxial)  Outcome: Ongoing (interventions implemented as appropriate)

## 2017-10-11 NOTE — PROGRESS NOTES
PROGRESS NOTE        Date of Admission: 10/10/2017  Length of Stay: 1  Primary Care Physician: Paulie Castaneda MD    Subjective   Chief Complaint:  Right knee pain  HPI: Patient was admitted yesterday after undergoing an elective right total knee replacement performed by Dr. Cruz.  Patient is currently lying in bed eating breakfast.  He denies any chest pain, abdominal pain, nausea or vomiting.  He had no events overnight.  He states he did not sleep very well however.  He OT has been consulted.           Review Of Systems:   Review of Systems   General ROS: Patient denies any fevers, chills or loss of consciousness.    Psychological ROS: Denies any hallucinations and delusions.  Ophthalmic ROS: Denies any diplopia or transient loss of vision.  ENT ROS: Denies sore throat, nasal congestion or ear pain.   Allergy and Immunology ROS: Denies rash or itching.  Hematological and Lymphatic ROS: Denies neck swelling or easy bleeding.  Endocrine ROS: Denies any recent unintentional weight gain or loss.  Breast ROS: Denies any pain or swelling.  Respiratory ROS: Denies cough or shortness of breath.   Cardiovascular ROS: Denies chest pain or palpitations. No history of exertional chest pain.   Gastrointestinal ROS: Denies nausea and vomiting. Denies any abdominal pain. No diarrhea.  Genito-Urinary ROS: Denies dysuria or hematuria.  Musculoskeletal ROS: Denies back pain. No muscle pain. No calf pain.   Neurological ROS: Denies any focal weakness. No loss of consciousness. Denies any numbness. Denies neck pain.   Dermatological ROS: Denies any redness or pruritis.    Objective      Temp:  [97.8 °F (36.6 °C)-98.8 °F (37.1 °C)] 98.4 °F (36.9 °C)  Heart Rate:  [] 108  Resp:  [12-19] 18  BP: (121-156)/(62-91) 140/81  Physical Exam    General Appearance:  Alert and cooperative, not in any acute distress.   Head:  Atraumatic and normocephalic, without obvious abnormality.   Eyes:          PERRLA, conjunctivae and  sclerae normal, no Icterus. No pallor. Extra-occular movements are within normal limits.   Ears:  Ears appear intact with no abnormalities noted.   Throat: No oral lesions, no thrush, oral mucosa moist.   Neck: Supple, trachea midline, no thyromegaly, no carotid bruit.   Back:   No kyphoscoliosis present. No tenderness to palpation,   range of motion normal.   Lungs:   Chest shape is normal. Breath sounds heard bilaterally equally.  No crackles or wheezing. No Pleural rub or bronchial breathing.   Heart:  Normal S1 and S2, no murmur, no gallop, no rub. No JVD   Abdomen:   Normal bowel sounds, no masses, no organomegaly. Soft     non-tender, non-distended, no guarding, no rebound                tenderness   Extremities: Moves all extremities well, no edema, no cyanosis, no clubbing.  Dressing right knee is clean and dry.     Pulses: Pulses palpable and equal bilaterally   Skin: No bleeding, bruising or rash   Lymph nodes: No palpable adenopathy   Neurologic:    Psychiatric/Behavior:     Cranial nerves 2 - 12 grossly intact, sensation intact, Motor power is normal and equal bilaterally.  Mood normal, behavior normal       Results Review:    I have reviewed the labs, radiology results and diagnostic studies.      Results from last 7 days  Lab Units 10/11/17  0559   WBC 10*3/mm3 14.87*   HEMOGLOBIN g/dL 13.3*   PLATELETS 10*3/mm3 221       Results from last 7 days  Lab Units 10/11/17  0559   SODIUM mmol/L 138   POTASSIUM mmol/L 4.6   CO2 mmol/L 24.0*   CREATININE mg/dL 0.80   GLUCOSE mg/dL 168*       Culture Data:    Radiology Data:   Cardiology Data:    I have reviewed the medications.    Assessment/Plan     Assessment and Plan:  1.  Diabetes mellitus-patient be placed on insulin protocol.   sugars are stable continue to monitor and titrate insulin accordingly.     2.  Chronic essential hypertension continue patient on home antihypertensive medications.     3.  Right total knee replacement-patient is on analgesics, DVT  prophylaxis, PT OT has been consulted as well as case management for discharge planning     4.  Osteoarthritis    5.  Mild leukocytosis-  Likely reactive.  Recheck in am.        DVT prophylaxis:  Arixtra  Discharge Planning:   LIO Lane 10/11/17 9:26 AM

## 2017-10-11 NOTE — PROGRESS NOTES
Continued Stay Note  ANUSHA Johnson     Patient Name: Boris Jerry  MRN: [id number]  Today's Date: 10/11/2017    Admit Date: 10/10/2017          Discharge Plan       10/11/17 1407    Case Management/Social Work Plan    Additional Comments Spoke to pt regarding  DME form list provided he choose [facility] They will deliver to room  He is going to go to out patient PT rehab in [place]               Discharge Codes     None        Expected Discharge Date and Time     Expected Discharge Date Expected Discharge Time    Oct 11, 2017             Oxana Black

## 2017-10-11 NOTE — PROGRESS NOTES
"Adult Nutrition  Assessment/PES    Patient Name:  Boris Jerry  YOB: 1950  MRN: [id number]  Admit Date:  10/10/2017    Assessment Date:  10/11/2017    Comments:  Rec. #1: Continue with current diet order. RD added Glucerna BID. Rec. #2: Consider adding MVI with minerals daily. RD to follow pt. Consult RD PRN.           Reason for Assessment       10/11/17 1311    Reason for Assessment    Reason For Assessment/Visit admission assessment    Identified At Risk By Screening Criteria diagnosis;BMI    Diagnosis Diagnosis    Nutrition related Increased nutrition needs    Endocrine DM Type 2    Ortho TKR    Skin Surgical wound                  Labs/Tests/Procedures/Meds       10/11/17 1314    Labs/Tests/Procedures/Meds    Labs/Tests Review Reviewed;Glucose;Hgb Hct   HIgh: Glucose    Medication Review Reviewed, pertinent;Diabetic Oral Agent;Insulin            Physical Findings       10/11/17 1320    Physical Findings/Assessment    Additional Documentation Physical Appearance (Group)    Physical Appearance    Overall Physical Appearance obese    Skin surgical wound            Estimated/Assessed Needs       10/11/17 1320    Calculation Measurements    Weight Used For Calculations 80.7 kg (177 lb 13.5 oz)    Height Used for Calculations 1.778 m (5' 10\")    Estimated/Assessed Energy Needs    Energy Need Method Dav[place]port-St Jeor    Age 66    RMR (Davenport-St. Jeor Equation) 1592.95    Activity Factors ([place] St Jeor)  Out of bed, ambulatory  1.3    Estimated Kcal Range  ~9144-0797    Estimated/Assessed Protein Needs    Weight Used for Protein Calculation 80.7 kg (177 lb 13.5 oz)    Protein (gm/kg) 1.2    1.2 Gm Protein (gm) 96.8    Estimated Protein Range 80.67-96.8    Estimated/Assessed Fluid Needs    Fluid Need Method RDA method    RDA Method (mL)  2370            Nutrition Prescription Ordered       10/11/17 1321    Nutrition Prescription PO    Current PO Diet Regular    Common Modifiers Consistent " Carbohydrate            Evaluation of Received Nutrient/Fluid Intake       10/11/17 1321    PO Evaluation    Number of Days PO Intake Evaluated Insufficient Data            Problem/Interventions:        Problem 1       10/11/17 1322    Nutrition Diagnoses Problem 1    Problem 1 Overweight/Obesity    Etiology (related to) Factors Affecting Nutrition    Food Habit/Preferences Large Meals    Signs/Symptoms (evidenced by) BMI    BMI 30 - 34.9            Problem 2       10/11/17 1324    Nutrition Diagnoses Problem 2    Problem 2 Increased Nutrient Needs    Macronutrient Kcal;Fluid;Protein    Micronutrient Vitamin;Mineral    Etiology (related to) Medical Diagnosis    Ortho TKR    Signs/Symptoms (evidenced by) Other (comment)   surgical wound healing                  Intervention Goal       10/11/17 1340    Intervention Goal    General Meet nutritional needs for age/condition    PO Meet estimated needs    Weight No significant weight loss            Nutrition Intervention       10/11/17 1340    Nutrition Intervention    RD/Tech Action Encourage intake;Recommend/ordered;Supplement provided;Follow Tx progress;Interview for preference;Advise available snack    Recommended/Ordered Supplement            Nutrition Prescription       10/11/17 1340    Nutrition Prescription PO    PO Prescription Begin/change supplement    Supplement Glucerna Shake    Supplement Frequency 2 times a day    New PO Prescription Ordered? Yes    Other Orders    Obtain Weight Daily    Obtain Weight Ordered? No, recommended    Supplement Vitamin mineral supplement    Supplement Ordered? No, recommended            Education/Evaluation       10/11/17 1343    Education    Education Provided education regarding;Education topics    Education Topics Diabetes    Monitor/Evaluation    Monitor Per protocol;PO intake;Supplement intake;Pertinent labs;Weight        Electronically signed by:  Adenike Luu RD  10/11/17 1:43 PM

## 2017-10-11 NOTE — THERAPY TREATMENT NOTE
Acute Care - Physical Therapy Treatment Note  [facility]     Patient Name: Boris Jerry  : 1950  MRN: [id number]  Today's Date: 10/11/2017  Onset of Illness/Injury or Date of Surgery Date: 10/10/17  Date of Referral to PT: 10/10/17  Referring Physician: Dr. Cruz    Admit Date: 10/10/2017    Visit Dx:    ICD-10-CM ICD-9-CM   1. Impaired functional mobility, balance, gait, and endurance Z74.09 V49.89   2. Primary osteoarthritis of both knees M17.0 715.16   3. Inflammatory osteoarthritis M19.90 715.90   4. Pseudogout M11.20 275.49     712.20   5. Arthralgia of right knee M25.561 719.46   6. Pre-op examination Z01.818 V72.84   7. Pre-op testing Z01.818 V72.84   8. Pain of right lower extremity M79.604 729.5   9. Myalgia M79.1 729.1   10. S/P total knee replacement using cement, right Z96.651 V43.65   11. Impaired mobility and ADLs Z74.09 799.89     Patient Active Problem List   Diagnosis   • Pseudogout   • Myalgia   • Pre-op testing   • Pre-op examination   • Primary osteoarthritis of both knees   • Inflammatory osteoarthritis   • Arthralgia of right knee   • Pain of right lower extremity               Adult Rehabilitation Note       10/11/17 1325 10/11/17 1315 10/11/17 0913    Rehab Assessment/Intervention    Discipline physical therapy assistant  -RM  physical therapy assistant  -RM    Document Type therapy note (daily note)  -RM  therapy note (daily note)  -RM    Subjective Information agree to therapy;complains of;pain  -RM  agree to therapy;complains of;pain;weakness  -RM    Patient Effort, Rehab Treatment good  -RM  good  -RM    Symptoms Noted During/After Treatment none  -RM  none  -RM    Precautions/Limitations fall precautions  -RM  fall precautions  -RM    Recorded by [RM] Jose Luis Zhang PTA  [RM] Jose Luis Zhang PTA    Pain Assessment    Pain Assessment 0-10  -RM  0-10  -RM    Pain Score 4  -RM  3  -RM    Post Pain Score 4  -RM  4  -RM    Pain Type Acute pain;Surgical pain  -RM  Acute  pain;Surgical pain  -RM    Pain Location Knee  -RM  Knee  -RM    Pain Orientation Right  -RM  Right  -RM    Pain Intervention(s)   Repositioned;Ambulation/increased activity;Cold applied  -RM    Recorded by [RM] Jose Luis Zhang PTA  [RM] Jose Luis Zhang PTA    Mobility Assessment/Training    Right Lower Extremity Weight-Bearing weight-bearing as tolerated  -RM weight-bearing as tolerated  -RM weight-bearing as tolerated  -RM    Recorded by [RM] Jose Luis Zhang PTA [RM] Jose Luis Zhang PTA [RM] Jose Luis Zhang PTA    Bed Mobility, Assessment/Treatment    Bed Mobility, Assistive Device   head of bed elevated  -RM    Bed Mob, Supine to Sit, [place]   verbal cues required;contact guard assist  -RM    Bed Mobility, Impairments   pain;strength decreased;ROM decreased  -RM    Recorded by   [RM] Jos eLuis Zhang PTA    Transfer Assessment/Treatment    Transfers, Sit-Stand [place] conditional independence  -RM  verbal cues required;contact guard assist  -RM    Transfers, Stand-Sit [place] conditional independence  -RM  verbal cues required;contact guard assist  -RM    Transfers, Sit-Stand-Sit, Assist Device rolling walker  -RM  rolling walker  -RM    Toilet Transfer, [place] conditional independence  -RM      Toilet Transfer, Assistive Device rolling walker  -RM      Transfer, Impairments pain;strength decreased  -RM  pain;strength decreased;ROM decreased  -RM    Recorded by [RM] Jose Luis Zhang, VERITO  [RM] Jose Luis Zhang PTA    Gait Assessment/Treatment    Gait, [place] Level conditional independence  -RM  verbal cues required;contact guard assist  -RM    Gait, Assistive Device rolling walker  -RM  rolling walker  -RM    Gait, Distance (Feet) 200  -RM  172  -RM    Gait, Gait Pattern Analysis swing-through gait  -RM  swing-through gait  -RM    Gait, Gait Deviations antalgic;right:  -RM  antalgic;right:;decreased heel strike  -RM    Gait, Safety Issues weight-shifting ability decreased   -RM  step length decreased  -RM    Gait, Impairments strength decreased;pain  -RM  ROM decreased;strength decreased;pain  -RM    Recorded by [] Jose Luis Zhang PTA  [] Jose Luis Zhang PTA    Therapy Exercises    Right Lower Extremity   AAROM:;10 reps;supine;ankle pumps/circles;glut sets;heel slides;hip abduction/adduction;quad sets;SAQ;SLR  -RM    Recorded by   [] Jose Luis Zhang PTA    Positioning and Restraints    Pre-Treatment Position sitting in chair/recliner  -RM  in bed  -RM    Post Treatment Position chair  -RM  chair  -RM    In Chair reclined;call light within reach;encouraged to call for assist;with family/caregiver;notified nsg  -RM  call light within reach;encouraged to call for assist;with family/caregiver;sitting;notified nsg  -RM    Recorded by [] Jose Luis Zhang PTA  [] Jose Luis Zhang PTA      User Key  (r) = Recorded By, (t) = Taken By, (c) = Cosigned By    Initials Name Effective Dates     Jose Luis Zhang PTA 10/26/16 -                 IP PT Goals       10/11/17 1632 10/11/17 1623 10/10/17 2036    Bed Mobility PT LTG    Bed Mobility PT LTG, Date Established   10/10/17  -JR    Bed Mobility PT LTG, Time to Achieve   2 wks  -JR    Bed Mobility PT LTG, Activity Type   all bed mobility  -JR    Bed Mobility PT LTG, [place] Level   conditional independence  -JR    Bed Mobility PT Goal  LTG, Assist Device   bed rails  -JR    Bed Mobility PT LTG, Outcome goal ongoing  -RM goal ongoing  -RM     Transfer Training PT LTG    Transfer Training PT LTG, Date Established   10/10/17  -JR    Transfer Training PT LTG, Time to Achieve   2 wks  -JR    Transfer Training PT LTG, Activity Type   sit to stand/stand to sit;bed to chair /chair to bed  -JR    Transfer Training PT LTG, [place] Level   contact guard assist  -JR    Transfer Training PT LTG, Assist Device   walker, rolling  -JR    Transfer Training PT LTG, Outcome  goal met  -RM     Gait Training PT LTG    Gait Training Goal PT  LTG, Date Established   10/10/17  -JR    Gait Training Goal PT LTG, Time to Achieve   2 wks  -JR    Gait Training Goal PT LTG, [place] Level   contact guard assist  -JR    Gait Training Goal PT LTG, Assist Device   walker, rolling  -JR    Gait Training Goal PT LTG, Distance to Achieve   350  -JR    Gait Training Goal PT LTG, Outcome goal partially met  -RM goal ongoing  -RM       User Key  (r) = Recorded By, (t) = Taken By, (c) = Cosigned By    Initials Name Provider Type    JR Barbara Gonzalez, PT Physical Therapist    RM Jose Luis Zhang, PTA Physical Therapy Assistant          Physical Therapy Education     Title: PT OT SLP Therapies (Done)     Topic: Physical Therapy (Done)     Point: Mobility training (Done)    Learning Progress Summary    Learner Readiness Method Response Comment Documented by Status   Patient Acceptance E,D VU,DU toilet transfers.  10/11/17 1632 Done    Acceptance E,D,H VU,DU HEP given copy in chart  10/11/17 1622 Done    Acceptance E VU Importance of mobility to recovery  10/10/17 2034 Done   Family Acceptance E,D,H VU,DU HEP given copy in chart  10/11/17 1622 Done   Significant Other Acceptance E VU Importance of mobility to recovery  10/10/17 2034 Done               Point: Home exercise program (Done)    Learning Progress Summary    Learner Readiness Method Response Comment Documented by Status   Patient Acceptance E,D,H VU,DU HEP given copy in chart  10/11/17 1622 Done   Family Acceptance E,D,H VU,DU HEP given copy in chart  10/11/17 1622 Done               Point: Body mechanics (Done)    Learning Progress Summary    Learner Readiness Method Response Comment Documented by Status   Patient Acceptance E,D,H VU,DU HEP given copy in chart  10/11/17 1622 Done   Family Acceptance E,D,H VU,DU HEP given copy in chart  10/11/17 1622 Done               Point: Precautions (Done)    Learning Progress Summary    Learner Readiness Method Response Comment Documented by Status   Patient  Acceptance E,D,H JOANA HERNANDEZ HEP given copy in chart RM 10/11/17 1622 Done   Family Acceptance E,D,H DAVID,JOANA HEP given copy in chart RM 10/11/17 1622 Done                      User Key     Initials Effective Dates Name Provider Type Discipline     10/26/16 -  Barbara Gonzalez, PT Physical Therapist PT     10/26/16 -  Jose Luis Zhang PTA Physical Therapy Assistant PT                    PT Recommendation and Plan  Anticipated Discharge Disposition: home with home health  Planned Therapy Interventions: balance training, ROM (Range of Motion), strengthening, bed mobility training, transfer training, gait training, patient/family education  PT Frequency: 2 times/day  Plan of Care Review  Plan Of Care Reviewed With: patient, spouse  Progress: improving  Outcome Summary/Follow up Plan: Pt presented with continued improvement with each treatment with decreased assistance required for transfers as well as improved gait sequencing. See flowsheet for details.            Outcome Measures       10/11/17 1325 10/11/17 1315 10/11/17 0913    How much help from another person do you currently need...    Turning from your back to your side while in flat bed without using bedrails? 4  -RM  4  -RM    Moving from lying on back to sitting on the side of a flat bed without bedrails? 4  -RM  4  -RM    Moving to and from a bed to a chair (including a wheelchair)? 4  -RM  3  -RM    Standing up from a chair using your arms (e.g., wheelchair, bedside chair)? 4  -RM  3  -RM    Climbing 3-5 steps with a railing? 3  -RM  3  -RM    To walk in hospital room? 4  -RM  3  -RM    AM-PAC 6 Clicks Score 23  -RM  20  -RM    How much help from another is currently needed...    Putting on and taking off regular lower body clothing?  4  -SD     Bathing (including washing, rinsing, and drying)  3  -SD     Toileting (which includes using toilet bed pan or urinal)  4  -SD     Putting on and taking off regular upper body clothing  4  -SD     Taking care of personal  grooming (such as brushing teeth)  4  -SD     Eating meals  4  -SD     Score  23  -SD     Functional Assessment    Outcome Measure Options AM-PAC 6 Clicks Basic Mobility (PT)  -RM AM-PAC 6 Clicks Daily Activity (OT)  -SD AM-PAC 6 Clicks Basic Mobility (PT)  -RM      10/10/17 1941          How much help from another person do you currently need...    Turning from your back to your side while in flat bed without using bedrails? 3  -JR      Moving from lying on back to sitting on the side of a flat bed without bedrails? 3  -JR      Moving to and from a bed to a chair (including a wheelchair)? 3  -JR      Standing up from a chair using your arms (e.g., wheelchair, bedside chair)? 3  -JR      Climbing 3-5 steps with a railing? 2  -JR      To walk in hospital room? 3  -JR      AM-PAC 6 Clicks Score 17  -JR      Functional Assessment    Outcome Measure Options AM-PAC 6 Clicks Basic Mobility (PT)  -JR        User Key  (r) = Recorded By, (t) = Taken By, (c) = Cosigned By    Initials Name Provider Type    JR Barbara Gonzalez, PT Physical Therapist    YOGI Zhang PTA Physical Therapy Assistant    KM Shaffer, OT Occupational Therapist           Time Calculation:         PT Charges       10/11/17 1635 10/11/17 1626       Time Calculation    Start Time 1325  -RM 0913  -RM     PT Received On 10/11/17  -RM 10/11/17  -RM     PT Goal Re-Cert Due Date 10/20/17  -RM 10/20/17  -RM     Time Calculation- PT    Total Timed Code Minutes- PT 30 minute(s)  -RM 40 minute(s)  -RM       User Key  (r) = Recorded By, (t) = Taken By, (c) = Cosigned By    Initials Name Provider Type     Jose Luis Zhang PTA Physical Therapy Assistant          Therapy Charges for Today     Code Description Service Date Service Provider Modifiers Qty    91505559569 HC GAIT TRAINING EA 15 MIN 10/11/2017 Jose Luis Zhang PTA GP 1    29824093517 HC PT THER PROC EA 15 MIN 10/11/2017 Jose Luis Zhang PTA GP 1    75396652974 HC GAIT TRAINING EA 15 MIN  10/11/2017 Jose Luis Zhang, PTA GP 1    70497578349 HC PT THERAPEUTIC ACT EA 15 MIN 10/11/2017 Jose Luis Zhang PTA GP 1          PT G-Codes  Outcome Measure Options: AM-PAC 6 Clicks Basic Mobility (PT)    Jose Luis Zhang PTA  10/11/2017

## 2017-10-11 NOTE — PROGRESS NOTES
Patient: Brois Jerry  Procedure(s):  ELECTIVE RIGHT TOTAL KNEE ARTHROPLASTY (S&N)  Anesthesia type: [unfilled]    Patient location: Med Surgical Floor  Last vitals: /81 (BP Location: Right arm, Patient Position: Lying)  Pulse 108  Temp 98.4 °F (36.9 °C) (Oral)   Resp 18  Wt 211 lb 9.6 oz (96 kg)  SpO2 95%  BMI 30.36 kg/m2  Level of consciousness: awake, alert and oriented    Post-anesthesia pain: adequate analgesia  Airway patency: patent  Respiratory: unassisted  Cardiovascular: stable and blood pressure at baseline  Hydration: euvolemic    Anesthetic complications: no

## 2017-10-11 NOTE — PLAN OF CARE
Problem: Patient Care Overview (Adult)  Goal: Plan of Care Review  Outcome: Ongoing (interventions implemented as appropriate)    10/10/17 2036   Coping/Psychosocial Response Interventions   Plan Of Care Reviewed With patient;spouse   Outcome Evaluation   Outcome Summary/Follow up Plan Patient participates well in skilled PT evaluation and demonstrates decreased strength, AROM, transfers, balance and gait. He is expected to make progress with additional PT services while hospitalized and upon discharged to home with home health care.         Problem: Inpatient Physical Therapy  Goal: Bed Mobility Goal LTG- PT  Outcome: Ongoing (interventions implemented as appropriate)    10/10/17 2036   Bed Mobility PT LTG   Bed Mobility PT LTG, Date Established 10/10/17   Bed Mobility PT LTG, Time to Achieve 2 wks   Bed Mobility PT LTG, Activity Type all bed mobility   Bed Mobility PT LTG, [place] Level conditional independence   Bed Mobility PT Goal LTG, Assist Device bed rails       Goal: Transfer Training Goal 1 LTG- PT  Outcome: Ongoing (interventions implemented as appropriate)    10/10/17 2036   Transfer Training PT LTG   Transfer Training PT LTG, Date Established 10/10/17   Transfer Training PT LTG, Time to Achieve 2 wks   Transfer Training PT LTG, Activity Type sit to stand/stand to sit;bed to chair /chair to bed   Transfer Training PT LTG, [place] Level contact guard assist   Transfer Training PT LTG, Assist Device walker, rolling       Goal: Gait Training Goal LTG- PT  Outcome: Ongoing (interventions implemented as appropriate)    10/10/17 2036   Gait Training PT LTG   Gait Training Goal PT LTG, Date Established 10/10/17   Gait Training Goal PT LTG, Time to Achieve 2 wks   Gait Training Goal PT LTG, [place] Level contact guard assist   Gait Training Goal PT LTG, Assist Device walker, rolling   Gait Training Goal PT LTG, Distance to Achieve 350

## 2017-10-11 NOTE — PROGRESS NOTES
ANUSHA Johnson    Nerve Cath Post Op Call    Patient Name: Boris Jerry  :  1950  MRN:  [id number]  Date of Discharge:     Nerve Cath Post Op Call:    Analgesia:Good  Pain Score:3/10  Side Effects:None  Catheter Site:clean  Patient Controlled ON Q pump infusion rate: 8ml/hr

## 2017-10-11 NOTE — THERAPY EVALUATION
Acute Care - Physical Therapy Initial Evaluation  [facility]     Patient Name: Boris Jerry  : 1950  MRN: [id number]  Today's Date: 10/10/2017   Onset of Illness/Injury or Date of Surgery Date: 10/10/17  Date of Referral to PT: 10/10/17  Referring Physician: Eric Cruz MD      Admit Date: 10/10/2017     Visit Dx:    ICD-10-CM ICD-9-CM   1. Impaired functional mobility, balance, gait, and endurance Z74.09 V49.89   2. Primary osteoarthritis of both knees M17.0 715.16   3. Inflammatory osteoarthritis M19.90 715.90   4. Pseudogout M11.20 275.49     712.20   5. Arthralgia of right knee M25.561 719.46   6. Pre-op examination Z01.818 V72.84   7. Pre-op testing Z01.818 V72.84   8. Pain of right lower extremity M79.604 729.5   9. Myalgia M79.1 729.1     Patient Active Problem List   Diagnosis   • Pseudogout   • Myalgia   • Pre-op testing   • Pre-op examination   • Primary osteoarthritis of both knees   • Inflammatory osteoarthritis   • Arthralgia of right knee   • Pain of right lower extremity     Past Medical History:   Diagnosis Date   • Anxiety    • Arthritis     IN KNEES   • Cancer     BASAL CELL REMOVED    • Cataract     REPORTS BILATERAL CATARACTS IN EARLY STAGES   • Diabetes mellitus    • H/O corticosteroid therapy     left knee injection, right shoulder, right knee   • Hearing loss in right ear     REPORTS MILD FROM PERFURATED EAR DRUM   • History of exercise stress test     REPORTS APPROXIMATELY 20 YEARS AGO AND THAT ALL WAS WNL'S AT THAT TIME   • Hypertension    • Osteoarthritis of knee    • Pseudogout of knee    • Seasonal allergies    • Wears contact lenses     TO BRING IN GLASSES THE DOS      Past Surgical History:   Procedure Laterality Date   • COLONOSCOPY     • HERNIA REPAIR Right     INGUINAL   • SKIN BIOPSY     • VASECTOMY     • WISDOM TOOTH EXTRACTION      REPORTS EXTRACTED X3          PT ASSESSMENT (last 72 hours)      PT Evaluation       10/10/17 1941 10/10/17 0948    Rehab Evaluation     Document Type evaluation  -JR     Subjective Information agree to therapy;complains of;pain  -JR     Patient Effort, Rehab Treatment good  -JR     Symptoms Noted During/After Treatment none  -JR     General Information    Patient Profile Review yes  -JR     Onset of Illness/Injury or Date of Surgery Date 10/10/17  -JR     Referring Physician Eric Cruz MD  -JR     General Observations Supine with wife at bedside, IV in LUE, hinton cath, dressing intact on Rt knee, On-Q in R thigh  -JR     Pertinent History Of Current Problem Right knee pain s/p TKA  -JR     Precautions/Limitations fall precautions  -JR     Prior Level of Function independent:;community mobility  -JR     Equipment Currently Used at Home  glucometer;other (see comments)  -KR    Plans/Goals Discussed With patient;spouse/S.O.;agreed upon  -JR     Risks Reviewed patient:;spouse/S.O.:;increased discomfort;dizziness  -JR     Benefits Reviewed patient:;spouse/S.O.:;increase balance;increase strength;increase independence;improve function  -JR     Barriers to Rehab none identified  -JR     Living Environment    Lives With spouse  -JR spouse  -KR    Living Arrangements house  -JR house  -KR    Home Accessibility bed and bath on same level  -JR bed and bath on same level;no concerns  -KR    Stair Railings at Home  none  -KR    Type of Financial/Environmental Concern  none  -KR    Transportation Available  car;family or friend will provide  -KR    Clinical Impression    Date of Referral to PT 10/10/17  -JR     PT Diagnosis Rt knee decreased AROM & strength, gait abnormality,pain  -JR     Prognosis Good  -JR     Patient/Family Goals Statement Patient wants to go home  -JR     Criteria for Skilled Therapeutic Interventions Met yes;treatment indicated  -JR     Pathology/Pathophysiology Noted (Describe Specifically for Each System) musculoskeletal  -JR     Impairments Found (describe specific impairments) gait, locomotion, and balance;joint integrity and  mobility;aerobic capacity/endurance  -JR     Rehab Potential good, to achieve stated therapy goals  -JR     Pain Assessment    Pain Assessment 0-10  -JR     Pain Score 8  -JR     Post Pain Score 8  -JR     Pain Type Acute pain;Surgical pain  -JR     Pain Location Knee  -JR     Pain Orientation Right  -JR     Pain Intervention(s) Medication (See MAR);Cold applied;Repositioned;Ambulation/increased activity  -JR     Response to Interventions no increase in pain with PT  -JR     Cognitive Assessment/Intervention    Current Cognitive/Communication Assessment functional  -JR     Orientation Status oriented x 4  -JR     Follows Commands/Answers Questions able to follow multi-step instructions  -JR     Personal Safety WNL/WFL  -JR     Personal Safety Interventions gait belt;fall prevention program maintained;nonskid shoes/slippers when out of bed  -JR     ROM (Range of Motion)    General ROM Detail WFL except Rt knee=10-55  -JR     MMT (Manual Muscle Testing)    General MMT Assessment Detail 4/5 except Rt knee 2/5  -JR     Mobility Assessment/Training    Extremity Weight-Bearing Status right lower extremity  -JR     Right Lower Extremity Weight-Bearing weight-bearing as tolerated  -     Bed Mobility, Assessment/Treatment    Bed Mobility, Assistive Device head of bed elevated;bed rails  -     Bed Mob, Supine to Sit, [place] minimum assist (75% patient effort)  -     Bed Mob, Sit to Supine, [place] minimum assist (75% patient effort)  -     Bed Mobility, Impairments pain;ROM decreased;strength decreased;coordination impaired  -     Transfer Assessment/Treatment    Transfers, Sit-Stand [place] minimum assist (75% patient effort)  -     Transfers, Stand-Sit [place] minimum assist (75% patient effort)  -     Transfers, Sit-Stand-Sit, Assist Device rolling walker  -     Transfer, Impairments pain;ROM decreased;strength decreased;coordination impaired  -     Gait Assessment/Treatment    Gait,  [place] Level minimum assist (75% patient effort)  -JR     Gait, Assistive Device rolling walker  -JR     Gait, Distance (Feet) 18  -JR     Gait, Gait Pattern Analysis swing-to gait  -JR     Gait, Gait Deviations antalgic;stride width increased;stride length decreased;toe-to-floor clearance decreased;decreased heel strike  -JR     Gait, Safety Issues step length decreased;sequencing ability decreased;balance decreased during turns  -JR     Gait, Impairments pain;ROM decreased;strength decreased;coordination impaired  -JR     Positioning and Restraints    Pre-Treatment Position in bed  -JR     Post Treatment Position bed  -JR     In Bed supine;call light within reach;encouraged to call for assist  -JR       User Key  (r) = Recorded By, (t) = Taken By, (c) = Cosigned By    Initials Name Provider Type     Barbara Gonzalez, PT Physical Therapist    ISRRAEL Chadwick RN Registered Nurse          Physical Therapy Education     Title: PT OT SLP Therapies (Active)     Topic: Physical Therapy (Active)     Point: Mobility training (Done)    Learning Progress Summary    Learner Readiness Method Response Comment Documented by Status   Patient Acceptance E VU Importance of mobility to recovery  10/10/17 2034 Done   Significant Other Acceptance E VU Importance of mobility to recovery  10/10/17 2034 Done                      User Key     Initials Effective Dates Name Provider Type Discipline     10/26/16 -  Barbara Gonzalez, PT Physical Therapist PT                PT Recommendation and Plan  Anticipated Discharge Disposition: home with home health  Planned Therapy Interventions: balance training, ROM (Range of Motion), strengthening, bed mobility training, transfer training, gait training, patient/family education  PT Frequency: 2 times/day  Plan of Care Review  Plan Of Care Reviewed With: patient, spouse  Outcome Summary/Follow up Plan: Patient participates well in skilled PT evaluation and demonstrates decreased strength,  AROM, transfers, balance and gait.  He is expected to make progress with additional PT services while hospitalized and upon discharged to home with home health care.          IP PT Goals       10/10/17 2036          Bed Mobility PT LTG    Bed Mobility PT LTG, Date Established 10/10/17  -JR      Bed Mobility PT LTG, Time to Achieve 2 wks  -JR      Bed Mobility PT LTG, Activity Type all bed mobility  -JR      Bed Mobility PT LTG, [place] Level conditional independence  -JR      Bed Mobility PT Goal  LTG, Assist Device bed rails  -JR      Transfer Training PT LTG    Transfer Training PT LTG, Date Established 10/10/17  -JR      Transfer Training PT LTG, Time to Achieve 2 wks  -JR      Transfer Training PT LTG, Activity Type sit to stand/stand to sit;bed to chair /chair to bed  -JR      Transfer Training PT LTG, [place] Level contact guard assist  -JR      Transfer Training PT LTG, Assist Device walker, rolling  -JR      Gait Training PT LTG    Gait Training Goal PT LTG, Date Established 10/10/17  -JR      Gait Training Goal PT LTG, Time to Achieve 2 wks  -JR      Gait Training Goal PT LTG, [place] Level contact guard assist  -JR      Gait Training Goal PT LTG, Assist Device walker, rolling  -JR      Gait Training Goal PT LTG, Distance to Achieve 350  -JR        User Key  (r) = Recorded By, (t) = Taken By, (c) = Cosigned By    Initials Name Provider Type    JR Barbara Gonzalez, PT Physical Therapist                Outcome Measures       10/10/17 1941          How much help from another person do you currently need...    Turning from your back to your side while in flat bed without using bedrails? 3  -JR      Moving from lying on back to sitting on the side of a flat bed without bedrails? 3  -JR      Moving to and from a bed to a chair (including a wheelchair)? 3  -JR      Standing up from a chair using your arms (e.g., wheelchair, bedside chair)? 3  -JR      Climbing 3-5 steps with a railing? 2  -JR      To  walk in hospital room? 3  -JR      AM-PAC 6 Clicks Score 17  -JR      Functional Assessment    Outcome Measure Options AM-PAC 6 Clicks Basic Mobility (PT)  -JR        User Key  (r) = Recorded By, (t) = Taken By, (c) = Cosigned By    Initials Name Provider Type    JR Barbara Gonzalez PT Physical Therapist           Time Calculation:         PT Charges       10/10/17 2035          Time Calculation    Start Time 1941  -      PT Received On 10/10/17  -      PT Goal Re-Cert Due Date 10/20/17  -        User Key  (r) = Recorded By, (t) = Taken By, (c) = Cosigned By    Initials Name Provider Type    JR Barbara Gonzalez PT Physical Therapist          Therapy Charges for Today     Code Description Service Date Service Provider Modifiers Qty    66371434662 HC PT EVAL LOW COMPLEXITY 4 10/10/2017 Barbara Gonzalez, PT GP 1          PT G-Codes  Outcome Measure Options: AM-PAC 6 Clicks Basic Mobility (PT)      Barbara Gonzalez PT  10/10/2017

## 2017-10-11 NOTE — PLAN OF CARE
Problem: Patient Care Overview (Adult)  Goal: Plan of Care Review  Outcome: Ongoing (interventions implemented as appropriate)    10/11/17 1600   Coping/Psychosocial Response Interventions   Plan Of Care Reviewed With patient   Outcome Evaluation   Outcome Summary/Follow up Plan OT eval completed. No further OT intervention is indicated. Patient completed LBD task with S/U without AE, functional transfers with cond ind, functional mob with SBA-CGA using RW, toileting task with sup and walked 68' with c/o 3/10 pain. Patient plans to DC home and received therapy on out-patient basis. Would benefit from rolling walker and shower bench upon DC home. Wife will be available to assist with ADLs as necesary.    Patient Care Overview   Progress no change

## 2017-10-11 NOTE — PLAN OF CARE
Problem: Patient Care Overview (Adult)  Goal: Plan of Care Review  Outcome: Ongoing (interventions implemented as appropriate)    10/11/17 1623   Coping/Psychosocial Response Interventions   Plan Of Care Reviewed With patient;spouse   Outcome Evaluation   Outcome Summary/Follow up Plan Pt presented with improved gait and activity tolerance with increased distance as well as tolerance of HEP. See flowsheet for details.    Patient Care Overview   Progress improving         Problem: Inpatient Physical Therapy  Goal: Bed Mobility Goal LTG- PT  Outcome: Ongoing (interventions implemented as appropriate)    10/10/17 2036 10/11/17 1623   Bed Mobility PT LTG   Bed Mobility PT LTG, Date Established 10/10/17 --    Bed Mobility PT LTG, Time to Achieve 2 wks --    Bed Mobility PT LTG, Activity Type all bed mobility --    Bed Mobility PT LTG, [place] Level conditional independence --    Bed Mobility PT Goal LTG, Assist Device bed rails --    Bed Mobility PT LTG, Outcome --  goal ongoing       Goal: Transfer Training Goal 1 LTG- PT  Outcome: Outcome(s) achieved Date Met:  10/11/17    10/10/17 2036 10/11/17 1623   Transfer Training PT LTG   Transfer Training PT LTG, Date Established 10/10/17 --    Transfer Training PT LTG, Time to Achieve 2 wks --    Transfer Training PT LTG, Activity Type sit to stand/stand to sit;bed to chair /chair to bed --    Transfer Training PT LTG, [place] Level contact guard assist --    Transfer Training PT LTG, Assist Device walker, rolling --    Transfer Training PT LTG, Outcome --  goal met       Goal: Gait Training Goal LTG- PT  Outcome: Ongoing (interventions implemented as appropriate)    10/10/17 2036 10/11/17 1623   Gait Training PT LTG   Gait Training Goal PT LTG, Date Established 10/10/17 --    Gait Training Goal PT LTG, Time to Achieve 2 wks --    Gait Training Goal PT LTG, [place] Level contact guard assist --    Gait Training Goal PT LTG, Assist Device walker, rolling --     Gait Training Goal PT LTG, Distance to Achieve 350 --    Gait Training Goal PT LTG, Outcome --  goal ongoing

## 2017-10-11 NOTE — PROGRESS NOTES
Discharge Planning Assessment  [facility]     Patient Name: Boris Jerry  MRN: [id number]  Today's Date: 10/11/2017    Admit Date: 10/10/2017          Discharge Needs Assessment       10/11/17 0929    Living Environment    Living Environment Comment only has a tub in bathroom shower  Instructed on location of purchasing shower chair     Living Environment    Provides Primary Care For no one    Quality Of Family Relationships supportive    Able to Return to Prior Living Arrangements yes    Discharge Needs Assessment    Concerns To Be Addressed discharge planning concerns    Readmission Within The Last 30 Days no previous admission in last 30 days    Equipment Needed After Discharge bath bench;walker, rolling    Discharge Contact Information if Applicable Boris Estes [phone]            Discharge Plan       10/11/17 0937    Case Management/Social Work Plan    Plan Home with  Home Health     Patient/Family In Agreement With Plan yes    Additional Comments Spoke to pt and wife at bedside with his premission  reviewd PT notes .Wife reports the downstairs bathroom does not anibal a shower and concerned for need for bathe chair Explained insurance does not pay for bath chair  Instucted her this  can be obtained at a retail store or drug store his goal to to return home with home health than transition to outpatient Pt rehab Gave them list of Home Health providers   in [facility]  Confirmed address and phone number  Does not have POA         Discharge Placement     No information found        Expected Discharge Date and Time     Expected Discharge Date Expected Discharge Time    Oct 14, 2017               Demographic Summary       10/11/17 0928    Referral Information    Admission Type inpatient    Arrived From home or self-care    Referral Source admission list    Reason For Consult discharge planning    Primary Care Physician Information    Name DR Castaneda             Functional Status       10/11/17  0928    IADL    Medications independent    Meal Preparation independent    Housekeeping independent    Laundry independent    Shopping independent    Oral Care independent            Psychosocial     None            Abuse/Neglect     None            Legal     None            Substance Abuse     None            Patient Forms     None          Oxana Black

## 2017-10-11 NOTE — PLAN OF CARE
Problem: Patient Care Overview (Adult)  Goal: Plan of Care Review  Outcome: Ongoing (interventions implemented as appropriate)    10/11/17 1927   Coping/Psychosocial Response Interventions   Plan Of Care Reviewed With patient   Outcome Evaluation   Outcome Summary/Follow up Plan Patient to be discharged.    Patient Care Overview   Progress improving

## 2017-10-11 NOTE — THERAPY DISCHARGE NOTE
Acute Care - Occupational Therapy Initial Eval/Discharge   [facility]     Patient Name: Boris Jerry  : 1950  MRN: [id number]  Today's Date: 10/11/2017  Onset of Illness/Injury or Date of Surgery Date: 10/10/17  Date of Referral to OT: 10/10/17  Referring Physician: Dr. Cruz      Admit Date: 10/10/2017       ICD-10-CM ICD-9-CM   1. Impaired functional mobility, balance, gait, and endurance Z74.09 V49.89   2. Primary osteoarthritis of both knees M17.0 715.16   3. Inflammatory osteoarthritis M19.90 715.90   4. Pseudogout M11.20 275.49     712.20   5. Arthralgia of right knee M25.561 719.46   6. Pre-op examination Z01.818 V72.84   7. Pre-op testing Z01.818 V72.84   8. Pain of right lower extremity M79.604 729.5   9. Myalgia M79.1 729.1   10. S/P total knee replacement using cement, right Z96.651 V43.65   11. Impaired mobility and ADLs Z74.09 799.89     Patient Active Problem List   Diagnosis   • Pseudogout   • Myalgia   • Pre-op testing   • Pre-op examination   • Primary osteoarthritis of both knees   • Inflammatory osteoarthritis   • Arthralgia of right knee   • Pain of right lower extremity     Past Medical History:   Diagnosis Date   • Anxiety    • Arthritis     IN KNEES   • Cancer     BASAL CELL REMOVED    • Cataract     REPORTS BILATERAL CATARACTS IN EARLY STAGES   • Diabetes mellitus    • H/O corticosteroid therapy     left knee injection, right shoulder, right knee   • Hearing loss in right ear     REPORTS MILD FROM PERFURATED EAR DRUM   • History of exercise stress test     REPORTS APPROXIMATELY 20 YEARS AGO AND THAT ALL WAS WNL'S AT THAT TIME   • Hypertension    • Osteoarthritis of knee    • Pseudogout of knee    • Seasonal allergies    • Wears contact lenses     TO BRING IN GLASSES THE DOS      Past Surgical History:   Procedure Laterality Date   • COLONOSCOPY     • HERNIA REPAIR Right     INGUINAL   • MD TOTAL KNEE ARTHROPLASTY Right 10/10/2017    Procedure: ELECTIVE RIGHT TOTAL KNEE  ARTHROPLASTY (S&N);  Surgeon: Eric Cruz MD;  Location: [facility] OR;  Service: Orthopedics   • SKIN BIOPSY     • VASECTOMY     • WISDOM TOOTH EXTRACTION      REPORTS EXTRACTED X3          OT ASSESSMENT FLOWSHEET (last 72 hours)      OT Evaluation       10/11/17 1315 10/11/17 1100 10/11/17 0929 10/10/17 1941 10/10/17 0948    Rehab Evaluation    Document Type evaluation  -SD   evaluation  -JR     Subjective Information agree to therapy;complains of;pain  -SD   agree to therapy;complains of;pain  -JR     Patient Effort, Rehab Treatment good  -SD   good  -JR     Symptoms Noted During/After Treatment none  -SD   none  -JR     General Information    Patient Profile Review yes  -SD   yes  -JR     Onset of Illness/Injury or Date of Surgery Date 10/10/17  -SD   10/10/17  -JR     Referring Physician Dr. Cruz  -SD   Eric Cruz MD  -JR     General Observations Patient received reclined in chair; wife present  -SD   Supine with wife at bedside, IV in LUE, hinton cath, dressing intact on Rt knee, On-Q in R thigh  -JR     Pertinent History Of Current Problem R TKA  -SD   Right knee pain s/p TKA  -JR     Precautions/Limitations fall precautions  -SD   fall precautions  -JR     Prior Level of Function independent:;all household mobility;community mobility;ADL's  -SD   independent:;community mobility  -JR     Equipment Currently Used at Home none  -SD    glucometer;other (see comments)  -KR    Plans/Goals Discussed With patient and family;agreed upon  -SD   patient;spouse/S.O.;agreed upon  -JR     Risks Reviewed other (comment);patient and family:   OT services not indicated  -SD   patient:;spouse/S.O.:;increased discomfort;dizziness  -JR     Benefits Reviewed    patient:;spouse/S.O.:;increase balance;increase strength;increase independence;improve function  -JR     Barriers to Rehab    none identified  -JR     Living Environment    Lives With spouse  -SD   spouse  -JR spouse  -KR    Living Arrangements house  -SD   house   -JR house  -KR    Home Accessibility bed and bath on same level  -SD   bed and bath on same level  -JR bed and bath on same level;no concerns  -KR    Stair Railings at Home none  -SD    none  -KR    Type of Financial/Environmental Concern none  -SD    none  -KR    Transportation Available car;family or friend will provide  -SD    car;family or friend will provide  -KR    Living Environment Comment   only has a tub in bathroom shower  Instructed on location of purchasing shower chair   -LP      Clinical Impression    Date of Referral to OT 10/10/17  -SD        OT Diagnosis R TKA  -SD        Impairments Found (describe specific impairments) aerobic capacity/endurance;gait, locomotion, and balance  -SD        Criteria for Skilled Therapeutic Interventions Met no;no problems identified which require skilled intervention  -SD        Therapy Frequency evaluation only  -SD        Anticipated Equipment Needs At Discharge shower chair;front wheeled walker  -SD        Anticipated Discharge Disposition home with assist  -SD        Functional Level Prior    Ambulation 0-->independent  -SD 0-->independent  -GA   0-->independent  -KR    Transferring 0-->independent  -SD 0-->independent  -GA   0-->independent  -KR    Toileting 0-->independent  -SD 0-->independent  -GA   0-->independent  -KR    Bathing 0-->independent  -SD 0-->independent  -GA   0-->independent  -KR    Dressing 0-->independent  -SD 0-->independent  -GA   0-->independent  -KR    Eating 0-->independent  -SD 0-->independent  -GA   0-->independent  -KR    Communication 0-->understands/communicates without difficulty  -SD 0-->understands/communicates without difficulty  -GA   0-->understands/communicates without difficulty  -KR    Swallowing 0-->swallows foods/liquids without difficulty  -SD 0-->swallows foods/liquids without difficulty  -GA   0-->swallows foods/liquids without difficulty  -KR    Prior Functional Level Comment  na  -GA       Pain Assessment    Pain  Assessment 0-10  -SD   0-10  -JR     Pain Score 3  -SD   8  -JR     Post Pain Score 3  -SD   8  -JR     Pain Type Acute pain;Surgical pain  -SD   Acute pain;Surgical pain  -JR     Pain Location Knee  -SD   Knee  -JR     Pain Orientation Right  -SD   Right  -JR     Pain Intervention(s)    Medication (See MAR);Cold applied;Repositioned;Ambulation/increased activity  -JR     Response to Interventions    no increase in pain with PT  -JR     Vision Assessment/Intervention    Visual Impairment WFL  -SD        Cognitive Assessment/Intervention    Current Cognitive/Communication Assessment functional  -SD   functional  -JR     Orientation Status oriented x 4  -SD   oriented x 4  -JR     Follows Commands/Answers Questions 100% of the time  -SD   able to follow multi-step instructions  -JR     Personal Safety WNL/WFL  -SD   WNL/WFL  -JR     Personal Safety Interventions    gait belt;fall prevention program maintained;nonskid shoes/slippers when out of bed  -JR     ROM (Range of Motion)    General ROM no range of motion deficits identified  -SD        General ROM Detail    WFL except Rt knee=10-55  -JR     MMT (Manual Muscle Testing)    General MMT Assessment no strength deficits identified  -SD        General MMT Assessment Detail    4/5 except Rt knee 2/5  -JR     Mobility Assessment/Training    Extremity Weight-Bearing Status    right lower extremity  -JR     Right Lower Extremity Weight-Bearing    weight-bearing as tolerated  -JR     Bed Mobility, Assessment/Treatment    Bed Mobility, Assistive Device    head of bed elevated;bed rails  -JR     Bed Mob, Supine to Sit, [place]    minimum assist (75% patient effort)  -JR     Bed Mob, Sit to Supine, [place]    minimum assist (75% patient effort)  -JR     Bed Mobility, Impairments    pain;ROM decreased;strength decreased;coordination impaired  -JR     Transfer Assessment/Treatment    Transfers, Sit-Stand [place] conditional independence  -SD   minimum assist (75%  patient effort)  -JR     Transfers, Stand-Sit [place] conditional independence  -SD   minimum assist (75% patient effort)  -JR     Transfers, Sit-Stand-Sit, Assist Device rolling walker  -SD   rolling walker  -JR     Transfer, Impairments    pain;ROM decreased;strength decreased;coordination impaired  -JR     Functional Mobility    Functional Mobility- Ind. Level supervision required;contact guard assist  -SD        Functional Mobility- Device rolling walker  -SD        Functional Mobility-Distance (Feet) 68  -SD        Upper Body Dressing Assessment/Training    UB Dressing Assess/Train, [place] independent  -SD        Lower Body Dressing Assessment/Training    LB Dressing Assess/Train, [place] set up required  -SD        Toileting Assessment/Training    Toileting Assess/Train, Indepen Level supervision required  -SD        Positioning and Restraints    Pre-Treatment Position sitting in chair/recliner  -SD   in bed  -JR     Post Treatment Position chair  -SD   bed  -JR     In Bed    supine;call light within reach;encouraged to call for assist  -JR     In Chair sitting;with PT;with family/caregiver  -SD          User Key  (r) = Recorded By, (t) = Taken By, (c) = Cosigned By    Initials Name Effective Dates    JR Barbara Gonzalez, PT 10/26/16 -     GA Zoie Hopkins, RN 10/26/16 -     YUSEF Black 10/26/16 -     ISRRAEL Chadwick, RN 11/07/16 -     SD Izabella Shaffer OT 06/30/17 -           Occupational Therapy Education     Title: PT OT SLP Therapies (Active)     Topic: Occupational Therapy (Active)     Point: ADL training (Done)    Description: Instruct learner(s) on proper safety adaptation and remediation techniques during self care or transfers.   Instruct in proper use of assistive devices.    Learning Progress Summary    Learner Readiness Method Response Comment Documented by Status   Patient Acceptance E VU Purpose of OT services and established that patient did not require OT interventions  at this time. SD 10/11/17 1604 Done                      User Key     Initials Effective Dates Name Provider Type Discipline    SD 06/30/17 -  Izabella Shaffer OT Occupational Therapist OT                OT Recommendation and Plan  Anticipated Equipment Needs At Discharge: shower chair, front wheeled walker  Anticipated Discharge Disposition: home with assist  Therapy Frequency: evaluation only  Plan of Care Review  Plan Of Care Reviewed With: patient  Progress: no change  Outcome Summary/Follow up Plan: OT eval completed. No further OT intervention is indicated. Patient completed LBD task with S/U without AE, functional transfers with cond ind, functional mob with SBA-CGA using RW, toileting task with sup and walked 68' with c/o 3/10 pain. Patient plans to DC home and received therapy on out-patient basis. Would benefit from rolling walker and shower bench upon DC home. Wife will be available to assist with ADLs as necesary.                Outcome Measures       10/11/17 1315 10/10/17 1941       How much help from another person do you currently need...    Turning from your back to your side while in flat bed without using bedrails?  3  -JR     Moving from lying on back to sitting on the side of a flat bed without bedrails?  3  -JR     Moving to and from a bed to a chair (including a wheelchair)?  3  -JR     Standing up from a chair using your arms (e.g., wheelchair, bedside chair)?  3  -JR     Climbing 3-5 steps with a railing?  2  -JR     To walk in hospital room?  3  -JR     AM-PAC 6 Clicks Score  17  -JR     How much help from another is currently needed...    Putting on and taking off regular lower body clothing? 4  -SD      Bathing (including washing, rinsing, and drying) 3  -SD      Toileting (which includes using toilet bed pan or urinal) 4  -SD      Putting on and taking off regular upper body clothing 4  -SD      Taking care of personal grooming (such as brushing teeth) 4  -SD      Eating meals 4  -SD       Score 23  -SD      Functional Assessment    Outcome Measure Options AM-PAC 6 Clicks Daily Activity (OT)  -SD AM-PAC 6 Clicks Basic Mobility (PT)  -       User Key  (r) = Recorded By, (t) = Taken By, (c) = Cosigned By    Initials Name Provider Type    JR Barbara Gonzalez, PT Physical Therapist    KM Shaffer, OT Occupational Therapist          Time Calculation:         Time Calculation- OT       10/11/17 1606          Time Calculation- OT    OT Start Time 1315  -SD      OT Received On 10/11/17  -SD      OT Goal Re-Cert Due Date 10/21/17  -SD        User Key  (r) = Recorded By, (t) = Taken By, (c) = Cosigned By    Initials Name Provider Type    KM Shaffer, OT Occupational Therapist          Therapy Charges for Today     Code Description Service Date Service Provider Modifiers Qty    48273779507  OT EVAL LOW COMPLEXITY 4 10/11/2017 Izabella Shaffer OT GO 1               OT Discharge Summary  Anticipated Discharge Disposition: home with assist    Izabella Shaffer OT  10/11/2017

## 2017-10-11 NOTE — THERAPY TREATMENT NOTE
Acute Care - Physical Therapy Treatment Note  [facility]     Patient Name: Boris Jerry  : 1950  MRN: [id number]  Today's Date: 10/11/2017  Onset of Illness/Injury or Date of Surgery Date: 10/10/17  Date of Referral to PT: 10/10/17  Referring Physician: Dr. Cruz    Admit Date: 10/10/2017    Visit Dx:    ICD-10-CM ICD-9-CM   1. Impaired functional mobility, balance, gait, and endurance Z74.09 V49.89   2. Primary osteoarthritis of both knees M17.0 715.16   3. Inflammatory osteoarthritis M19.90 715.90   4. Pseudogout M11.20 275.49     712.20   5. Arthralgia of right knee M25.561 719.46   6. Pre-op examination Z01.818 V72.84   7. Pre-op testing Z01.818 V72.84   8. Pain of right lower extremity M79.604 729.5   9. Myalgia M79.1 729.1   10. S/P total knee replacement using cement, right Z96.651 V43.65   11. Impaired mobility and ADLs Z74.09 799.89     Patient Active Problem List   Diagnosis   • Pseudogout   • Myalgia   • Pre-op testing   • Pre-op examination   • Primary osteoarthritis of both knees   • Inflammatory osteoarthritis   • Arthralgia of right knee   • Pain of right lower extremity               Adult Rehabilitation Note       10/11/17 0913          Rehab Assessment/Intervention    Discipline physical therapy assistant  -RM      Document Type therapy note (daily note)  -RM      Subjective Information agree to therapy;complains of;pain;weakness  -RM      Patient Effort, Rehab Treatment good  -RM      Symptoms Noted During/After Treatment none  -RM      Precautions/Limitations fall precautions  -RM      Recorded by [RM] Jose Luis Zhang PTA      Pain Assessment    Pain Assessment 0-10  -RM      Pain Score 3  -RM      Post Pain Score 4  -RM      Pain Type Acute pain;Surgical pain  -RM      Pain Location Knee  -RM      Pain Orientation Right  -RM      Pain Intervention(s) Repositioned;Ambulation/increased activity;Cold applied  -RM      Recorded by [RM] Jose Luis Zhang PTA      Mobility  Assessment/Training    Right Lower Extremity Weight-Bearing weight-bearing as tolerated  -RM      Recorded by [RM] Jose Luis Zhang PTA      Bed Mobility, Assessment/Treatment    Bed Mobility, Assistive Device head of bed elevated  -RM      Bed Mob, Supine to Sit, [place] verbal cues required;contact guard assist  -RM      Bed Mobility, Impairments pain;strength decreased;ROM decreased  -RM      Recorded by [] Jose Luis Zhang PTA      Transfer Assessment/Treatment    Transfers, Sit-Stand [place] verbal cues required;contact guard assist  -RM      Transfers, Stand-Sit [place] verbal cues required;contact guard assist  -RM      Transfers, Sit-Stand-Sit, Assist Device rolling walker  -RM      Transfer, Impairments pain;strength decreased;ROM decreased  -RM      Recorded by [] Jose Luis Zhang PTA      Gait Assessment/Treatment    Gait, [place] Level verbal cues required;contact guard assist  -RM      Gait, Assistive Device rolling walker  -RM      Gait, Distance (Feet) 172  -RM      Gait, Gait Pattern Analysis swing-through gait  -RM      Gait, Gait Deviations antalgic;right:;decreased heel strike  -RM      Gait, Safety Issues step length decreased  -RM      Gait, Impairments ROM decreased;strength decreased;pain  -RM      Recorded by [] Jose Luis Zhang PTA      Therapy Exercises    Right Lower Extremity AAROM:;10 reps;supine;ankle pumps/circles;glut sets;heel slides;hip abduction/adduction;quad sets;SAQ;SLR  -RM      Recorded by [] Jose Luis Zhang PTA      Positioning and Restraints    Pre-Treatment Position in bed  -RM      Post Treatment Position chair  -RM      In Chair call light within reach;encouraged to call for assist;with family/caregiver;sitting;notified nsg  -RM      Recorded by [] Jose Luis Zhang PTA        User Key  (r) = Recorded By, (t) = Taken By, (c) = Cosigned By    Initials Name Effective Dates     Jose Luis Zhang PTA 10/26/16 -                 IP PT Goals        10/11/17 1623 10/10/17 2036       Bed Mobility PT LTG    Bed Mobility PT LTG, Date Established  10/10/17  -JR     Bed Mobility PT LTG, Time to Achieve  2 wks  -JR     Bed Mobility PT LTG, Activity Type  all bed mobility  -JR     Bed Mobility PT LTG, [place] Level  conditional independence  -JR     Bed Mobility PT Goal  LTG, Assist Device  bed rails  -JR     Bed Mobility PT LTG, Outcome goal ongoing  -RM      Transfer Training PT LTG    Transfer Training PT LTG, Date Established  10/10/17  -JR     Transfer Training PT LTG, Time to Achieve  2 wks  -JR     Transfer Training PT LTG, Activity Type  sit to stand/stand to sit;bed to chair /chair to bed  -JR     Transfer Training PT LTG, [place] Level  contact guard assist  -JR     Transfer Training PT LTG, Assist Device  walker, rolling  -JR     Transfer Training PT LTG, Outcome goal met  -RM      Gait Training PT LTG    Gait Training Goal PT LTG, Date Established  10/10/17  -JR     Gait Training Goal PT LTG, Time to Achieve  2 wks  -JR     Gait Training Goal PT LTG, [place] Level  contact guard assist  -JR     Gait Training Goal PT LTG, Assist Device  walker, rolling  -JR     Gait Training Goal PT LTG, Distance to Achieve  350  -JR     Gait Training Goal PT LTG, Outcome goal ongoing  -RM        User Key  (r) = Recorded By, (t) = Taken By, (c) = Cosigned By    Initials Name Provider Type    JR Barbara Gonzalez, PT Physical Therapist    RM Jose Luis Zhang, PTA Physical Therapy Assistant          Physical Therapy Education     Title: PT OT SLP Therapies (Done)     Topic: Physical Therapy (Done)     Point: Mobility training (Done)    Learning Progress Summary    Learner Readiness Method Response Comment Documented by Status   Patient Acceptance CHEMA PEDERSON,JOANA SMITH HEP given copy in chart  10/11/17 1622 Done    Acceptance DACIA HERNANDEZ Importance of mobility to recovery JR 10/10/17 2034 Done   Family Acceptance CHEMA PEDERSON H VU, DU HEP given copy in chart  10/11/17 1622 Done    Significant Other Acceptance E VU Importance of mobility to recovery  10/10/17 2034 Done               Point: Home exercise program (Done)    Learning Progress Summary    Learner Readiness Method Response Comment Documented by Status   Patient Acceptance E,D,H VU,DU HEP given copy in chart  10/11/17 1622 Done   Family Acceptance E,D,H VU,DU HEP given copy in chart  10/11/17 1622 Done               Point: Body mechanics (Done)    Learning Progress Summary    Learner Readiness Method Response Comment Documented by Status   Patient Acceptance E,D,H VU,DU HEP given copy in chart  10/11/17 1622 Done   Family Acceptance E,D,H VU,DU HEP given copy in chart  10/11/17 1622 Done               Point: Precautions (Done)    Learning Progress Summary    Learner Readiness Method Response Comment Documented by Status   Patient Acceptance E,D,H VU,DU HEP given copy in chart  10/11/17 1622 Done   Family Acceptance E,D,H VU,DU HEP given copy in chart  10/11/17 1622 Done                      User Key     Initials Effective Dates Name Provider Type Discipline     10/26/16 -  Barbara Gonzalez, PT Physical Therapist PT     10/26/16 -  Jose Luis Zhang PTA Physical Therapy Assistant PT                    PT Recommendation and Plan  Anticipated Discharge Disposition: home with home health  Planned Therapy Interventions: balance training, ROM (Range of Motion), strengthening, bed mobility training, transfer training, gait training, patient/family education  PT Frequency: 2 times/day  Plan of Care Review  Plan Of Care Reviewed With: patient, spouse  Progress: improving  Outcome Summary/Follow up Plan: Pt presented with improved gait and activity tolerance with increased distance as well as tolerance of HEP.            Outcome Measures       10/11/17 1315 10/11/17 0913 10/10/17 1941    How much help from another person do you currently need...    Turning from your back to your side while in flat bed without using bedrails?  4   -RM 3  -JR    Moving from lying on back to sitting on the side of a flat bed without bedrails?  4  -RM 3  -JR    Moving to and from a bed to a chair (including a wheelchair)?  3  -RM 3  -JR    Standing up from a chair using your arms (e.g., wheelchair, bedside chair)?  3  -RM 3  -JR    Climbing 3-5 steps with a railing?  3  -RM 2  -JR    To walk in hospital room?  3  -RM 3  -JR    AM-PAC 6 Clicks Score  20  -RM 17  -JR    How much help from another is currently needed...    Putting on and taking off regular lower body clothing? 4  -SD      Bathing (including washing, rinsing, and drying) 3  -SD      Toileting (which includes using toilet bed pan or urinal) 4  -SD      Putting on and taking off regular upper body clothing 4  -SD      Taking care of personal grooming (such as brushing teeth) 4  -SD      Eating meals 4  -SD      Score 23  -SD      Functional Assessment    Outcome Measure Options AM-PAC 6 Clicks Daily Activity (OT)  -SD AM-PAC 6 Clicks Basic Mobility (PT)  -RM AM-PAC 6 Clicks Basic Mobility (PT)  -JR      User Key  (r) = Recorded By, (t) = Taken By, (c) = Cosigned By    Initials Name Provider Type    JR Barbara Gonzalez, PT Physical Therapist    YOGI Zhang PTA Physical Therapy Assistant    KM Shaffer, OT Occupational Therapist           Time Calculation:         PT Charges       10/11/17 1626          Time Calculation    Start Time 0913  -RM      PT Received On 10/11/17  -RM      PT Goal Re-Cert Due Date 10/20/17  -RM      Time Calculation- PT    Total Timed Code Minutes- PT 40 minute(s)  -RM        User Key  (r) = Recorded By, (t) = Taken By, (c) = Cosigned By    Initials Name Provider Type     Jose Luis Zhang PTA Physical Therapy Assistant          Therapy Charges for Today     Code Description Service Date Service Provider Modifiers Qty    36836221719 HC GAIT TRAINING EA 15 MIN 10/11/2017 Jose Luis Zhang PTA GP 1    78480445933 HC PT THER PROC EA 15 MIN 10/11/2017 Jose Luis Zhang  PTA GP 1          PT G-Codes  Outcome Measure Options: AM-PAC 6 Clicks Daily Activity (OT)    Jose Luis Zhang, PTA  10/11/2017

## 2017-10-11 NOTE — PLAN OF CARE
Problem: Patient Care Overview (Adult)  Goal: Plan of Care Review  Outcome: Ongoing (interventions implemented as appropriate)    10/11/17 1632   Coping/Psychosocial Response Interventions   Plan Of Care Reviewed With patient;spouse   Outcome Evaluation   Outcome Summary/Follow up Plan Pt presented with continued improvement with each treatment with decreased assistance required for transfers as well as improved gait sequencing. See flowsheet for details.    Patient Care Overview   Progress improving         Problem: Inpatient Physical Therapy  Goal: Bed Mobility Goal LTG- PT  Outcome: Ongoing (interventions implemented as appropriate)    10/10/17 2036 10/11/17 1632   Bed Mobility PT LTG   Bed Mobility PT LTG, Date Established 10/10/17 --    Bed Mobility PT LTG, Time to Achieve 2 wks --    Bed Mobility PT LTG, Activity Type all bed mobility --    Bed Mobility PT LTG, Melfa Level conditional independence --    Bed Mobility PT Goal LTG, Assist Device bed rails --    Bed Mobility PT LTG, Outcome --  goal ongoing       Goal: Gait Training Goal LTG- PT  Outcome: Ongoing (interventions implemented as appropriate)    10/10/17 2036 10/11/17 1632   Gait Training PT LTG   Gait Training Goal PT LTG, Date Established 10/10/17 --    Gait Training Goal PT LTG, Time to Achieve 2 wks --    Gait Training Goal PT LTG, Melfa Level contact guard assist --    Gait Training Goal PT LTG, Assist Device walker, rolling --    Gait Training Goal PT LTG, Distance to Achieve 350 --    Gait Training Goal PT LTG, Outcome --  goal partially met

## 2017-10-12 LAB
GLUCOSE BLDC GLUCOMTR-MCNC: 163 MG/DL (ref 70–130)
GLUCOSE BLDC GLUCOMTR-MCNC: 184 MG/DL (ref 70–130)

## 2017-10-12 NOTE — PROGRESS NOTES
ANUSHA Johnson    Nerve Cath Post Op Call    Patient Name: Boris Jerry  :  1950  MRN:  [id number]  Date of Discharge: 10/11/2017    Nerve Cath Post Op Call:    Analgesia:Good  Pain Score:4/10  Side Effects:None  Catheter Site:clean  Patient Controlled ON Q pump infusion rate: 8ml/hr (Patient will bolus at 14ml/hr and change rate to 10ml/hr)  Catheter Plan:Modify plan as follows (see note) and The patient was instructed to call ON CALL Anesthesia provider for any questions or problems      Patient discharged home yesterday afternoon (10/11/2017) with on Q ball in situ. Infusion set at 8ml/hr. Patient taking PO home hydrocodone every 4-6 hours. Discussed with patients spouse turning rate up to 14ml/hr x 1 hour prior to home PT/ambulation and changing basal rate to 10ml/hr thereafter

## 2017-10-12 NOTE — DISCHARGE SUMMARY
Name:  Boris Jerry   Age:  66 y.o.  Sex:  male  :  1950  MRN:  [id number]   Visit Number:  [id number]  Primary Care Physician:  Paulie Castaneda MD  Date of Discharge:  10-11-17  Admission Date:  10/10/2017  9:21 AM      Discharge Diagnosis:       Patient Active Problem List   Diagnosis   • Pseudogout   • Myalgia   • Pre-op testing   • Pre-op examination   • Primary osteoarthritis of both knees   • Inflammatory osteoarthritis   • Arthralgia of right knee   • Pain of right lower extremity       Presenting Problem/History of Present Illness:    Pseudogout [M11.20]  Myalgia [M79.1]  Pre-op testing [Z01.818]  Pre-op examination [Z01.818]  Primary osteoarthritis of both knees [M17.0]  Inflammatory osteoarthritis [M19.90]  Arthralgia of right knee [M25.561]  Pain of right lower extremity [M79.604]  Primary osteoarthritis of both knees [M17.0]       Consults:     Consults     Date and Time Order Name Status Description    10/10/2017 1552 Inpatient Consult to Hospitalist Completed           Procedures Performed:    Procedure(s):  ELECTIVE RIGHT TOTAL KNEE ARTHROPLASTY (S&N)         History of presenting illness:    Patient is status post day #1 elective right total knee arthroplasty secondary to advanced osteoarthritis.  She has tried several conservative treatment measures prior to proceeding with the surgery all without relief.  Patient states currently, his pain is well controlled.  He denies chest pain or shortness of breath.  He denies dizziness or lightheadedness.  Patient states he would like to go home if at all possible today.          Vital Signs:    Temp:  [98.3 °F (36.8 °C)] 98.3 °F (36.8 °C)  Heart Rate:  [94] 94  Resp:  [18] 18  BP: (139)/(80) 139/80    Physical Exam:    General Appearance alert, appears stated age and cooperative  Head normocephalic, without obvious abnormality and atraumatic  Eyes conjunctivae and sclerae normal  Nose nares normal  Throat oral mucosa moist  Lungs  clear to auscultation  Heart regular rhythm & normal rate  Abdomen soft non-tender and no rebound tenderness  Extremities no cyanosis, no redness, Otis's sign negative, no ischemic changes, no ulcers and no clubbing  Pulses Pulses palpable and equal bilaterally  Skin no bleeding, bruising or rash  Neurologic Mental Status orientated to person, place, time and situation    The right knee incision is clean, dry and pristine.  The Dermabond is intact.  Very mild tenderness to palpation on the medial and lateral aspect of the knee.  Mild edema    Pertinent Lab Results:     Lab Results (all)     Procedure Component Value Units Date/Time    POC Glucose Fingerstick [[id number]]  (Normal) Collected:  10/10/17 1008    Specimen:  Blood Updated:  10/10/17 1015     Glucose 124 mg/dL       Serial Number: [id number]:  [id number]       Tissue Pathology Exam - Bone, Knee, Right [[id number]] Collected:  10/10/17 1229    Specimen:  Bone from Knee, Right Updated:  10/10/17 1421    CBC & Differential [[id number]] Collected:  10/11/17 0559    Specimen:  Blood Updated:  10/11/17 0623    Narrative:       The following orders were created for panel order CBC & Differential.  Procedure                               Abnormality         Status                     ---------                               -----------         ------                     CBC Auto Differential[[id number]]        Abnormal            Final result                 Please view results for these tests on the individual orders.    CBC Auto Differential [[id number]]  (Abnormal) Collected:  10/11/17 0559    Specimen:  Blood Updated:  10/11/17 0623     WBC 14.87 (H) 10*3/mm3      RBC 4.34 (L) 10*6/mm3      Hemoglobin 13.3 (L) g/dL      Hematocrit 38.2 (L) %      MCV 88.0 fL      MCH 30.6 pg      MCHC 34.8 g/dL      RDW 11.7 %      RDW-SD 37.9 fl      MPV 10.2 fL      Platelets 221 10*3/mm3      Neutrophil % 84.9 (H) %      Lymphocyte % 5.7 (L) %      Monocyte % 8.5 %       Eosinophil % 0.0 %      Basophil % 0.2 %      Immature Grans % 0.7 (H) %      Neutrophils, Absolute 12.61 (H) 10*3/mm3      Lymphocytes, Absolute 0.85 10*3/mm3      Monocytes, Absolute 1.27 (H) 10*3/mm3      Eosinophils, Absolute 0.00 10*3/mm3      Basophils, Absolute 0.03 10*3/mm3      Immature Grans, Absolute 0.11 (H) 10*3/mm3      nRBC 0.0 /100 WBC     Basic Metabolic Panel [[id number]]  (Abnormal) Collected:  10/11/17 0559    Specimen:  Blood Updated:  10/11/17 0639     Glucose 168 (H) mg/dL      BUN 15 mg/dL      Creatinine 0.80 mg/dL      Sodium 138 mmol/L      Potassium 4.6 mmol/L      Chloride 106 mmol/L      CO2 24.0 (L) mmol/L      Calcium 9.3 mg/dL      eGFR Non African Amer 97 mL/min/1.73      BUN/Creatinine Ratio 18.8     Anion Gap 12.6 mmol/L     Narrative:       Abnormal estimated GFR should be followed by more specific studies to confirm end stage chronic renal disease. The equation used for calculation may not be accurate for patients less than 19 years old, greater than 70 years old, patients at extremes of weight, malnutrition, or with acute renal dysfunction.    POC Glucose Fingerstick [[id number]]  (Abnormal) Collected:  10/11/17 1110    Specimen:  Blood Updated:  10/11/17 1141     Glucose 170 (H) mg/dL       Serial Number: [id number]:  [id number]       Hemoglobin A1c [[id number]]  (Abnormal) Collected:  10/11/17 0559    Specimen:  Blood Updated:  10/11/17 1911     Hemoglobin A1C 6.1 (H) %     Narrative:       Expected HgbA1C results:     3% to 6% HgbA1C      HgbA1C                 Estimated Average Glucose     5%                              97 mg/dl   6%                             126 mg/dl   7%                             154 mg/dl   8%                             183 mg/dl   9%                             212 mg/dl  >10%                           >240 mg/dl            Pertinent Radiology Results:    Imaging Results (all)     Procedure Component Value Units Date/Time    XR Knee 1 or 2 View  Right [[id number]] Collected:  10/10/17 1517     Updated:  10/10/17 1519    Narrative:       PROCEDURE: XR KNEE 1 OR 2 VW RIGHT-     HISTORY: Post-op R TKR; M17.0-Bilateral primary osteoarthritis of knee;  M19.90-Unspecified osteoarthritis, unspecified site; M11.20-Other  chondrocalcinosis, unspecified site; M25.561-Pain in right knee;  Z01.818-Encounter for other preprocedural examination; Z01.818-Encounter  for other preprocedural examination; M79.604-Pain in right leg;  M79.1-Myalgia     COMPARISON: July 19, 2017.     FINDINGS:  A 2 view exam demonstrates total right knee arthroplasty.   There are no hardware complications.  The expected postoperative fluid  and gas is seen in the soft tissues.           Impression:       Status post total right knee arthroplasty with no hardware  complications.                 This report was finalized on 10/10/2017 3:17 PM by Germain Parada M.D..          Condition on Discharge:      stable      Discharge Disposition:    Home or Self Care    Discharge Medication:     Boris Jerry   Home Medication Instructions KAYLYN:[id number]    Printed on:10/12/17 0735   Medication Information                      amLODIPine (NORVASC) 2.5 MG tablet  TK 1 T PO QD             aspirin 81 MG EC tablet  Take 81 mg by mouth daily.             Atorvastatin Calcium (LIPITOR PO)  Take 20 mg by mouth Every Night.             Cyanocobalamin (VITAMIN B-12 PO)  Take 1,000 mcg by mouth Daily.             enoxaparin (LOVENOX) 30 MG/0.3ML solution syringe  Inject contents of 1 syringe under the skin Every 12 (Twelve) Hours.             HYDROcodone-acetaminophen (NORCO)  MG per tablet  Take 1 tablet by mouth Every 6 (Six) Hours As Needed (for pain).             lisinopril (PRINIVIL,ZESTRIL) 40 MG tablet  TK 1 T PO ONCE DAILY IN THE MORNING             mupirocin (BACTROBAN) 2 % ointment  Apply  topically 3 (Three) Times a Day.             sitaGLIPtin-metFORMIN (JANUMET)  MG per  tablet  Take 1 tablet by mouth Daily.                 Discharge Diet:     Diet Instructions     Advance Diet As Tolerated                     Activity at Discharge:         Follow-up Appointments:    Future Appointments  Date Time Provider Department Center   10/23/2017 2:30 PM Eric Cruz MD [facility] None     Additional Instructions for the Follow-ups that You Need to Schedule     Ambulatory Referral to Physical Therapy Evaluate and treat, Ortho    As directed    Total knee joint pathway,  Weightbearing as tolerated with a walker   Specialty modality needed?:   Evaluate and treat  Ortho                    Test Results Pending at Discharge:     Order Current Status    Tissue Pathology Exam - Bone, Knee, Right In process        A/P:    S/P Right total knee arthroplasty:    Discharge home with durable medical equipment.  Patient will be on Lovenox for 9 days.  Continue to use incentive spirometer and BEST hose until follow-up appointment.  Patient requests an order for outpatient physical therapy therapy- his wife states she will be able to communicate and take him to the appointments.     Jessee Lu PA-C  10/12/17  7:35 AM    Time: Discharge 22 min

## 2017-10-13 LAB
LAB AP CASE REPORT: NORMAL
Lab: NORMAL
PATH REPORT.FINAL DX SPEC: NORMAL

## 2017-10-13 NOTE — PROGRESS NOTES
ANUSHA Johnson    Nerve Cath Post Op Call    Patient Name: Boris Jerry  :  1950  MRN:  [id number]  Date of Discharge: 10/11/2017    Nerve Cath Post Op Call:    Analgesia:Fair  Side Effects:None  Catheter Plan:Patient/Family member report nerve catheter previously discontinued, tip intact  Pt denies quad weakness and has feeling over anterior thigh and shin.

## 2017-10-23 ENCOUNTER — OFFICE VISIT (OUTPATIENT)
Dept: ORTHOPEDIC SURGERY | Facility: CLINIC | Age: 67
End: 2017-10-23

## 2017-10-23 VITALS — RESPIRATION RATE: 16 BRPM | BODY MASS INDEX: 27.49 KG/M2 | WEIGHT: 192 LBS | HEIGHT: 70 IN

## 2017-10-23 DIAGNOSIS — Z96.651 STATUS POST TOTAL RIGHT KNEE REPLACEMENT USING CEMENT: Primary | ICD-10-CM

## 2017-10-23 PROCEDURE — 99024 POSTOP FOLLOW-UP VISIT: CPT | Performed by: ORTHOPAEDIC SURGERY

## 2017-10-23 RX ORDER — ATORVASTATIN CALCIUM 20 MG/1
TABLET, FILM COATED ORAL
Refills: 3 | COMMUNITY
Start: 2017-10-02

## 2017-10-23 NOTE — PROGRESS NOTES
Subjective   Patient ID: Boris Jerry is a 66 y.o. right hand dominant male is here today for a post-operative visit  Post-op of the Right Knee        History of Present Illness     Pain controlled: [] no   [x] yes   Medication refill requested: [x] no   [] yes    Patient compliant with instructions: [] no   [x] yes   Other: He reports pain control and ambulation progress early after R TKR.     Past Medical History:   Diagnosis Date   • Anxiety    • Arthritis     IN KNEES   • Cancer     BASAL CELL REMOVED    • Cataract     REPORTS BILATERAL CATARACTS IN EARLY STAGES   • Diabetes mellitus    • H/O corticosteroid therapy     left knee injection, right shoulder, right knee   • Hearing loss in right ear     REPORTS MILD FROM PERFURATED EAR DRUM   • History of exercise stress test     REPORTS APPROXIMATELY 20 YEARS AGO AND THAT ALL WAS WNL'S AT THAT TIME   • Hypertension    • Osteoarthritis of knee    • Pseudogout of knee    • Seasonal allergies    • Wears contact lenses     TO BRING IN GLASSES THE DOS         Past Surgical History:   Procedure Laterality Date   • COLONOSCOPY     • HERNIA REPAIR Right     INGUINAL   • [place] TOTAL KNEE ARTHROPLASTY Right 10/10/2017    Procedure: ELECTIVE RIGHT TOTAL KNEE ARTHROPLASTY (S&N);  Surgeon: Eric Cruz MD;  Location: [facility];  Service: Orthopedics   • SKIN BIOPSY     • VASECTOMY     • WISDOM TOOTH EXTRACTION      REPORTS EXTRACTED X3       No Known Allergies    Review of Systems   Constitutional: Negative for fever.   HENT: Negative for voice change.    Eyes: Negative for visual disturbance.   Respiratory: Negative for shortness of breath.    Cardiovascular: Negative for chest pain.   Gastrointestinal: Negative for abdominal distention and abdominal pain.   Genitourinary: Negative for dysuria.   Musculoskeletal: Positive for arthralgias. Negative for gait problem and joint swelling.   Skin: Negative for rash.   Neurological: Negative for speech difficulty.  "  Hematological: Does not bruise/bleed easily.   Psychiatric/Behavioral: Negative for confusion.       Objective   Resp 16  Ht 70\" (177.8 cm)  Wt 192 lb (87.1 kg)  BMI 27.55 kg/m2      Signs of infection: [x] no                    [] yes   Drainage: [x] no                    [] yes   Incision: [x] healing well     []healed well   Motor exam intact: [] no                    [x] yes   Neurovascular exam intact: [] no                    [x] yes   Signs of compartment syndrome: [x] no                    [] yes   Signs of DVT: [x] no                    [] yes   Other:      Physical Exam   Constitutional: He appears well-developed. No distress.   Musculoskeletal:        Right knee: He exhibits effusion (1+).   Neurological: He is alert.   Skin: Skin is warm and dry. No rash noted. No erythema.   Psychiatric: He has a normal mood and affect. His speech is normal.   Vitals reviewed.    Right Knee Exam     Tenderness   Right knee tenderness location: diffuse anterior soreness.    Range of Motion   Extension: -5   Flexion: 90     Muscle Strength     The patient has normal right knee strength.    Tests   Varus: negative  Valgus: negative  Patellar Apprehension: negative    Other   Erythema: absent  Scars: present (healing well)  Pulse: present  Swelling: mild  Other tests: effusion (1+) present      Back Exam     Muscle Strength   Right Quadriceps:  5/5   Right Hamstrings:  5/5         Extremity DVT signs are Negative on physical exam with negative Otis sign, with no calf pain, with no palpable cords, with no increased pain with passive stretch/extension and with no skin tone change  Neurologic Exam     Mental Status   Attention: normal.   Speech: speech is normal   Level of consciousness: alert  Knowledge: good.     Motor Exam   Overall muscle tone: normal    Strength   Right quadriceps: 5/5  Right hamstrin/5    Gait, Coordination, and Reflexes     Gait  Gait: (stable gait with early pos-op mild knee extension lag " limp.  He was guided on techniques for knee mobility, extension and correction of gait )    Right Knee Exam     Muscle Strength   Normal right knee strength        Assessment/Plan   Independent Review of Radiographic Studies:    No new imaging done today.  Laboratory and Other Studies:  No new results reviewed today.   Medical Decision Making:    No complications of procedure and treatments.  Stable post-operative exam and expected early progress.     Procedures  [x] No procedures were performed in office today.     Boris was seen today for post-op.    Diagnoses and all orders for this visit:    Status post total right knee replacement using cement         Recommendations/Plan:     Dermabond [x] Removed today   Splint/cast applied: [x]no []SAC []LAC []SLC []LLC []PTB []Splint:    Brace: [x]not provided        []pt provided with:   Physical therapy: []not at this time    []home-based    [x]outpatient referral   Ultrasound: [x]not ordered         []order given to patient   Labs: [x]not ordered         []order given to patient   Weight Bearing status: []Full [x]WBAT []PWB []NWB []Other   Imaging at next appt: []Yes  [x]No          Additional instructions: Patient agreeable to return sooner for any new concern.     Regular exercise as tolerated  Orthopedic activities reviewed and patient expressed appreciation  Discussion of orthopedic goals  Risk, benefits, and merits of treatment alternatives reviewed with the patient and questions answered  Ice, heat, and/or modalities as beneficial  After care and dental prophylaxis for joint replacement prosthesis  Watch for signs and symptoms of infection  Guided on proper techniques for mobility, strength, agility and/or conditioning exercises  Discussed proper alignment for sleeping as tolerated  Discussed use of compression socks for DVT prevention     Exercise, medications, injections, other patient advice, and return appointment as noted.  Brace: No brace was given at  today's visit  Referral: No referrals made at today's visit  Test/Studies: No additional studies ordered.  Surgery: No surgery proposed at this visit.  Work/Activity Status: May perform usual activities as tolerated, No strenuous activity. and No contact sports  Patient is encouraged to call or return for any issues or concerns.    Return in about 6 weeks (around 12/4/2017) for Recheck.  Patient agreeable to call or return sooner for any concerns.

## 2017-11-10 RX ORDER — HYDROCODONE BITARTRATE AND ACETAMINOPHEN 10; 325 MG/1; MG/1
1 TABLET ORAL EVERY 12 HOURS PRN
Qty: 20 TABLET | Refills: 0 | Status: SHIPPED | OUTPATIENT
Start: 2017-11-10 | End: 2017-12-07 | Stop reason: HOSPADM

## 2017-11-21 DIAGNOSIS — Z09 FOLLOW UP: Primary | ICD-10-CM

## 2017-11-22 ENCOUNTER — OFFICE VISIT (OUTPATIENT)
Dept: ORTHOPEDIC SURGERY | Facility: CLINIC | Age: 67
End: 2017-11-22

## 2017-11-22 VITALS — RESPIRATION RATE: 18 BRPM | TEMPERATURE: 98.9 F | BODY MASS INDEX: 27.49 KG/M2 | HEIGHT: 70 IN | WEIGHT: 192 LBS

## 2017-11-22 DIAGNOSIS — Z96.651 STATUS POST TOTAL RIGHT KNEE REPLACEMENT USING CEMENT: Primary | ICD-10-CM

## 2017-11-22 PROCEDURE — 99024 POSTOP FOLLOW-UP VISIT: CPT | Performed by: PHYSICIAN ASSISTANT

## 2017-11-22 NOTE — PROGRESS NOTES
Subjective   Patient ID: Boris Jerry is a 67 y.o. right hand dominant male  Pain and Post-op of the Right Knee         History of Present Illness      Patient presents for evaluation of right knee pain, swelling and stiffness.  He denies redness.  He denies numbness or tingling.  Denies chest pain or shortness of breath.  He states he is currently receiving physical therapy.    He tells me he takes an occasional ibuprofen 200 mg every 4-6 hours.  Pain Score: 7  Pain Location: Knee  Pain Orientation: Right     Pain Descriptors: Aching, Throbbing, Discomfort, Sore, Sharp  Pain Frequency: Constant/continuous           Aggravating Factors: Standing, Walking        Pain Intervention(s): Rest          Past Medical History:   Diagnosis Date   • Anxiety    • Arthritis     IN KNEES   • Cancer     BASAL CELL REMOVED    • Cataract     REPORTS BILATERAL CATARACTS IN EARLY STAGES   • Diabetes mellitus    • H/O corticosteroid therapy     left knee injection, right shoulder, right knee   • Hearing loss in right ear     REPORTS MILD FROM PERFURATED EAR DRUM   • History of exercise stress test     REPORTS APPROXIMATELY 20 YEARS AGO AND THAT ALL WAS WNL'S AT THAT TIME   • Hypertension    • Osteoarthritis of knee    • Pseudogout of knee    • Seasonal allergies    • Wears contact lenses     TO BRING IN GLASSES THE DOS         Past Surgical History:   Procedure Laterality Date   • COLONOSCOPY     • HERNIA REPAIR Right     INGUINAL   • [place] TOTAL KNEE ARTHROPLASTY Right 10/10/2017    Procedure: ELECTIVE RIGHT TOTAL KNEE ARTHROPLASTY (S&N);  Surgeon: Eric Cruz MD;  Location: [facility];  Service: Orthopedics   • SKIN BIOPSY     • VASECTOMY     • WISDOM TOOTH EXTRACTION      REPORTS EXTRACTED X3       Family History   Problem Relation Age of Onset   • Lymphoma Father    • Melanoma Son        Social History     Social History   • Marital status:      Spouse name: N/A   • Number of children: N/A   • Years of education:  "N/A     Occupational History   • Not on file.     Social History Main Topics   • Smoking status: Former Smoker     Packs/day: 1.00     Years: 5.00     Types: Cigarettes   • Smokeless tobacco: Never Used      Comment: REPORTS QUIT AROUND 1972   • Alcohol use Yes      Comment: REPORTS BOURBON, 3 SHOTS, 4-5 TIMES WEEKLY   • Drug use: No   • Sexual activity: Defer     Other Topics Concern   • Not on file     Social History Narrative       No Known Allergies    Review of Systems   Constitutional: Negative for fever.   HENT: Negative for voice change.    Eyes: Negative for visual disturbance.   Respiratory: Negative for shortness of breath.    Cardiovascular: Negative for chest pain.   Gastrointestinal: Negative for abdominal distention and abdominal pain.   Genitourinary: Negative for dysuria.   Musculoskeletal: Positive for arthralgias. Negative for gait problem and joint swelling.   Skin: Negative for rash.   Neurological: Negative for speech difficulty.   Hematological: Does not bruise/bleed easily.   Psychiatric/Behavioral: Negative for confusion.       Objective   Temp 98.9 °F (37.2 °C)  Resp 18  Ht 70\" (177.8 cm)  Wt 192 lb (87.1 kg)  BMI 27.55 kg/m2   Physical Exam   Constitutional: He is oriented to person, place, and time. He appears well-nourished.   Eyes: Conjunctivae are normal.   Neck: No tracheal deviation present.   Pulmonary/Chest: Effort normal.   Musculoskeletal:        Right knee: He exhibits decreased range of motion and swelling. He exhibits no effusion, no ecchymosis, no deformity and no erythema. Tenderness found. Medial joint line and lateral joint line tenderness noted.        Right ankle: No tenderness. No lateral malleolus and no medial malleolus tenderness found.   Neurological: He is alert and oriented to person, place, and time.   Psychiatric: He has a normal mood and affect. His behavior is normal.   Vitals reviewed.    Right Knee Exam     Range of Motion   Right knee extension: 6. "   Right knee flexion: 65.     Other   Erythema: absent  Sensation: normal  Pulse: present  Other tests: no effusion present           Extremity DVT signs are Negative on physical exam with negative Otis sign, with no calf pain, with no palpable cords, with no increased pain with passive stretch/extension and with no skin tone change   Neurologic Exam     Mental Status   Oriented to person, place, and time.      Right Ankle Exam     Tenderness   The patient is experiencing tenderness in the no medial malleolus.    Right Knee Exam     Tenderness   The patient is experiencing tenderness in the medial joint line, lateral joint line.         Incision site is clean and dry      Assessment/Plan   Independent Review of Radiographic Studies:    Indication to evaluate status of prosthesis and joint, and compared with prior imaging, shows no acute fracture or dislocation. Good position and alignment of prosthesis with no radiographic signs of loosening.       Procedures       Boris was seen today for pain and post-op.    Diagnoses and all orders for this visit:    Status post total right knee replacement using cement     Orthopedic activities reviewed and patient expressed appreciation  Discussion of orthopedic goals  Risk, benefits, and merits of treatment alternatives reviewed with the patient and questions answered  Ice, heat, and/or modalities as beneficial  After care and dental prophylaxis for joint replacement prosthesis    Recommendations/Plan:  Exercise, medications, injections, other patient advice, and return appointment as noted.  Patient is encouraged to call or return for any issues or concerns.    FU in 4 weeks    Dr. Cruz also evaluated the patient and feels that if he does not improve with his range of motion at his next follow-up visit he may benefit from manipulation.    Patient agreeable to call or return sooner for any concerns.

## 2017-12-04 ENCOUNTER — APPOINTMENT (OUTPATIENT)
Dept: PREADMISSION TESTING | Facility: HOSPITAL | Age: 67
End: 2017-12-04

## 2017-12-04 ENCOUNTER — HOSPITAL ENCOUNTER (OUTPATIENT)
Dept: GENERAL RADIOLOGY | Facility: HOSPITAL | Age: 67
Discharge: HOME | End: 2017-12-04
Admitting: PHYSICIAN ASSISTANT

## 2017-12-04 ENCOUNTER — OFFICE VISIT (OUTPATIENT)
Dept: ORTHOPEDIC SURGERY | Facility: CLINIC | Age: 67
End: 2017-12-04

## 2017-12-04 VITALS — HEIGHT: 70 IN | WEIGHT: 192 LBS | BODY MASS INDEX: 27.49 KG/M2 | RESPIRATION RATE: 18 BRPM

## 2017-12-04 VITALS — WEIGHT: 192 LBS | HEIGHT: 70 IN | BODY MASS INDEX: 27.49 KG/M2

## 2017-12-04 DIAGNOSIS — Z01.818 PRE-OP TESTING: ICD-10-CM

## 2017-12-04 DIAGNOSIS — M24.661 ARTHROFIBROSIS OF KNEE JOINT, RIGHT: Primary | ICD-10-CM

## 2017-12-04 DIAGNOSIS — Z96.651 STATUS POST TOTAL RIGHT KNEE REPLACEMENT USING CEMENT: ICD-10-CM

## 2017-12-04 LAB
ALBUMIN SERPL-MCNC: 4.7 G/DL (ref 3.5–5)
ALBUMIN/GLOB SERPL: 1.6 G/DL (ref 1–2)
ALP SERPL-CCNC: 100 U/L (ref 38–126)
ALT SERPL W P-5'-P-CCNC: 38 U/L (ref 13–69)
ANION GAP SERPL CALCULATED.3IONS-SCNC: 15.2 MMOL/L
AST SERPL-CCNC: 21 U/L (ref 15–46)
BASOPHILS # BLD AUTO: 0.08 10*3/MM3 (ref 0–0.2)
BASOPHILS NFR BLD AUTO: 1.1 % (ref 0–2.5)
BILIRUB SERPL-MCNC: 0.5 MG/DL (ref 0.2–1.3)
BUN BLD-MCNC: 16 MG/DL (ref 7–20)
BUN/CREAT SERPL: 20 (ref 6.3–21.9)
CALCIUM SPEC-SCNC: 10.1 MG/DL (ref 8.4–10.2)
CHLORIDE SERPL-SCNC: 101 MMOL/L (ref 98–107)
CO2 SERPL-SCNC: 26 MMOL/L (ref 26–30)
CREAT BLD-MCNC: 0.8 MG/DL (ref 0.6–1.3)
DEPRECATED RDW RBC AUTO: 41.1 FL (ref 37–54)
EOSINOPHIL # BLD AUTO: 0.14 10*3/MM3 (ref 0–0.7)
EOSINOPHIL NFR BLD AUTO: 1.9 % (ref 0–7)
ERYTHROCYTE [DISTWIDTH] IN BLOOD BY AUTOMATED COUNT: 12.6 % (ref 11.5–14.5)
GFR SERPL CREATININE-BSD FRML MDRD: 96 ML/MIN/1.73
GLOBULIN UR ELPH-MCNC: 2.9 GM/DL
GLUCOSE BLD-MCNC: 114 MG/DL (ref 74–98)
HCT VFR BLD AUTO: 43 % (ref 42–52)
HGB BLD-MCNC: 14.2 G/DL (ref 14–18)
IMM GRANULOCYTES # BLD: 0.03 10*3/MM3 (ref 0–0.06)
IMM GRANULOCYTES NFR BLD: 0.4 % (ref 0–0.6)
LYMPHOCYTES # BLD AUTO: 2.1 10*3/MM3 (ref 0.6–3.4)
LYMPHOCYTES NFR BLD AUTO: 27.9 % (ref 10–50)
MCH RBC QN AUTO: 29.4 PG (ref 27–31)
MCHC RBC AUTO-ENTMCNC: 33 G/DL (ref 30–37)
MCV RBC AUTO: 89 FL (ref 80–94)
MONOCYTES # BLD AUTO: 0.95 10*3/MM3 (ref 0–0.9)
MONOCYTES NFR BLD AUTO: 12.6 % (ref 0–12)
NEUTROPHILS # BLD AUTO: 4.24 10*3/MM3 (ref 2–6.9)
NEUTROPHILS NFR BLD AUTO: 56.1 % (ref 37–80)
NRBC BLD MANUAL-RTO: 0 /100 WBC (ref 0–0)
PLATELET # BLD AUTO: 309 10*3/MM3 (ref 130–400)
PMV BLD AUTO: 10.6 FL (ref 6–12)
POTASSIUM BLD-SCNC: 4.2 MMOL/L (ref 3.5–5.1)
PROT SERPL-MCNC: 7.6 G/DL (ref 6.3–8.2)
RBC # BLD AUTO: 4.83 10*6/MM3 (ref 4.7–6.1)
SODIUM BLD-SCNC: 138 MMOL/L (ref 137–145)
WBC NRBC COR # BLD: 7.54 10*3/MM3 (ref 4.8–10.8)

## 2017-12-04 PROCEDURE — 80053 COMPREHEN METABOLIC PANEL: CPT | Performed by: PHYSICIAN ASSISTANT

## 2017-12-04 PROCEDURE — 36415 COLL VENOUS BLD VENIPUNCTURE: CPT | Performed by: PHYSICIAN ASSISTANT

## 2017-12-04 PROCEDURE — 85025 COMPLETE CBC W/AUTO DIFF WBC: CPT | Performed by: PHYSICIAN ASSISTANT

## 2017-12-04 PROCEDURE — 71020 HC CHEST PA AND LATERAL: CPT

## 2017-12-04 PROCEDURE — 99024 POSTOP FOLLOW-UP VISIT: CPT | Performed by: PHYSICIAN ASSISTANT

## 2017-12-04 NOTE — PROGRESS NOTES
Subjective   Patient ID: Boris Jerry is a 67 y.o.  male is here today for follow-up for right knee stiffness Post-op and Pain of the Right Knee         History of Present Illness      Patient presents for continued right knee pain and stiffness.  He denies injury or trauma.  He states he has performed home exercises as well as outpatient therapy routinely with no improvement in his right knee decreased range of motion.  Patient had right total knee arthroplasty 10/10/2017 by Dr. Cruz.    Past Medical History:   Diagnosis Date   • Anxiety    • Arthritis     IN KNEES   • Cancer     BASAL CELL REMOVED    • Cataract     REPORTS BILATERAL CATARACTS IN EARLY STAGES   • Diabetes mellitus    • H/O corticosteroid therapy     left knee injection, right shoulder, right knee   • Hearing loss in right ear     REPORTS MILD FROM PERFURATED EAR DRUM   • History of exercise stress test     REPORTS APPROXIMATELY 20 YEARS AGO AND THAT ALL WAS WNL'S AT THAT TIME   • Hypertension    • Osteoarthritis of knee    • Pseudogout of knee    • Seasonal allergies    • Wears contact lenses     TO BRING IN GLASSES THE DOS         Past Surgical History:   Procedure Laterality Date   • COLONOSCOPY     • HERNIA REPAIR Right     INGUINAL   • [place] TOTAL KNEE ARTHROPLASTY Right 10/10/2017    Procedure: ELECTIVE RIGHT TOTAL KNEE ARTHROPLASTY (S&N);  Surgeon: Eric Cruz MD;  Location: [facility];  Service: Orthopedics   • SKIN BIOPSY     • VASECTOMY     • WISDOM TOOTH EXTRACTION      REPORTS EXTRACTED X3       Family History   Problem Relation Age of Onset   • Lymphoma Father    • Melanoma Son        Social History     Social History   • Marital status:      Spouse name: N/A   • Number of children: N/A   • Years of education: N/A     Occupational History   • Not on file.     Social History Main Topics   • Smoking status: Former Smoker     Packs/day: 1.00     Years: 5.00     Types: Cigarettes     Quit date: 1/1/1973   • Smokeless  "tobacco: Never Used   • Alcohol use Yes      Comment: REPORTS BOURBON, 3 SHOTS, 4-5 TIMES WEEKLY.  REPORTS NO ETOH SINCE SURGERY DONE 10-10-17.   • Drug use: No   • Sexual activity: Yes     Partners: Female     Other Topics Concern   • Not on file     Social History Narrative       No Known Allergies    Review of Systems   Constitutional: Negative for fever.   HENT: Negative for voice change.    Eyes: Negative for visual disturbance.   Respiratory: Negative for shortness of breath.    Cardiovascular: Negative for chest pain.   Gastrointestinal: Negative for abdominal distention and abdominal pain.   Genitourinary: Negative for dysuria.   Musculoskeletal: Positive for arthralgias. Negative for gait problem and joint swelling.   Skin: Negative for rash.   Neurological: Negative for speech difficulty.   Hematological: Does not bruise/bleed easily.   Psychiatric/Behavioral: Negative for confusion.       Objective   Resp 18  Ht 70\" (177.8 cm)  Wt 192 lb (87.1 kg)  BMI 27.55 kg/m2   Physical Exam   Constitutional: He is oriented to person, place, and time. He appears well-nourished.   Neck: No tracheal deviation present.   Cardiovascular: Normal rate.    Pulmonary/Chest: No respiratory distress.   Musculoskeletal:        Right knee: He exhibits decreased range of motion and swelling. He exhibits no ecchymosis and no erythema. Tenderness found. Medial joint line and lateral joint line tenderness noted.   Neurological: He is alert and oriented to person, place, and time.   Skin: No rash noted.   Psychiatric: He has a normal mood and affect. His behavior is normal.   Vitals reviewed.    Right Knee Exam     Range of Motion   Right knee extension: 10.   Right knee flexion: 71.     Other   Erythema: absent  Scars: present  Sensation: normal  Pulse: present  Swelling: mild           Extremity DVT signs are Negative on physical exam with negative Otis sign, with no calf pain, with no palpable cords, with no increased pain " with passive stretch/extension and with no skin tone change   Neurologic Exam     Mental Status   Oriented to person, place, and time.      Right Knee Exam     Tenderness   The patient is experiencing tenderness in the medial joint line, lateral joint line.               Assessment/Plan   Independent Review of Radiographic Studies:    No new imaging done today.  Reviewed at a prior visit.      Procedures       Boris was seen today for post-op and pain.    Diagnoses and all orders for this visit:    Arthrofibrosis of knee joint, right  -     Case Request; Standing  -     Follow anesthesia standing orders.  -     Provide instructions to patient regarding NPO status  -     Obtain informed consent  -     Clorhexidine skin prep  -     CBC and Differential  -     Comprehensive metabolic panel  -     XR chest 2 vw  -     Follow Anesthesia Guidelines / Standing Orders; Standing  -     Notify physician (specify); Standing  -     ceFAZolin (ANCEF) 2 g in sodium chloride 0.9 % 100 mL IVPB; Infuse 2 g into a venous catheter On Call to the Operating Room.  -     famotidine (PEPCID) injection 20 mg; Infuse 2 mL into a venous catheter 60 Minutes Prior to Surgery.  -     Ambulatory Referral to Physical Therapy Evaluate and treat  -     Case Request  -     CBC Auto Differential    Pre-op testing  -     Case Request; Standing  -     Follow anesthesia standing orders.  -     Provide instructions to patient regarding NPO status  -     Obtain informed consent  -     Clorhexidine skin prep  -     CBC and Differential  -     Comprehensive metabolic panel  -     XR chest 2 vw  -     Follow Anesthesia Guidelines / Standing Orders; Standing  -     Notify physician (specify); Standing  -     ceFAZolin (ANCEF) 2 g in sodium chloride 0.9 % 100 mL IVPB; Infuse 2 g into a venous catheter On Call to the Operating Room.  -     famotidine (PEPCID) injection 20 mg; Infuse 2 mL into a venous catheter 60 Minutes Prior to Surgery.  -     Ambulatory  Referral to Physical Therapy Evaluate and treat  -     Case Request  -     CBC Auto Differential    Status post total right knee replacement using cement       Regular exercise as tolerated  Orthopedic activities reviewed and patient expressed appreciation  Discussion of orthopedic goals  Risk, benefits, and merits of treatment alternatives reviewed with the patient and questions answered  The nature of the proposed surgery reviewed with the patient including risks, benefits, rehabilitation, recovery timeframe, and outcome expectations    Recommendations/Plan:  Patient is encouraged to call or return for any issues or concerns.    Plan for right knee manipulation    Patient agreeable to call or return sooner for any concerns.

## 2017-12-07 ENCOUNTER — HOSPITAL ENCOUNTER (OUTPATIENT)
Facility: HOSPITAL | Age: 67
Setting detail: HOSPITAL OUTPATIENT SURGERY
Discharge: HOME | End: 2017-12-07
Attending: ORTHOPAEDIC SURGERY | Admitting: ORTHOPAEDIC SURGERY

## 2017-12-07 ENCOUNTER — ANESTHESIA (OUTPATIENT)
Dept: PERIOP | Facility: HOSPITAL | Age: 67
End: 2017-12-07

## 2017-12-07 ENCOUNTER — ANESTHESIA EVENT (OUTPATIENT)
Dept: PERIOP | Facility: HOSPITAL | Age: 67
End: 2017-12-07

## 2017-12-07 VITALS
OXYGEN SATURATION: 97 % | RESPIRATION RATE: 18 BRPM | TEMPERATURE: 97.1 F | HEART RATE: 80 BPM | DIASTOLIC BLOOD PRESSURE: 85 MMHG | SYSTOLIC BLOOD PRESSURE: 168 MMHG

## 2017-12-07 DIAGNOSIS — Z01.818 PRE-OP TESTING: ICD-10-CM

## 2017-12-07 DIAGNOSIS — M24.661 ARTHROFIBROSIS OF KNEE JOINT, RIGHT: ICD-10-CM

## 2017-12-07 LAB — GLUCOSE BLDC GLUCOMTR-MCNC: 97 MG/DL (ref 70–130)

## 2017-12-07 PROCEDURE — 25010000002 PROPOFOL 10 MG/ML EMULSION: Performed by: NURSE ANESTHETIST, CERTIFIED REGISTERED

## 2017-12-07 PROCEDURE — 25010000002 METHYLPREDNISOLONE PER 80 MG: Performed by: ORTHOPAEDIC SURGERY

## 2017-12-07 PROCEDURE — 82962 GLUCOSE BLOOD TEST: CPT

## 2017-12-07 PROCEDURE — 25010000002 FENTANYL CITRATE (PF) 100 MCG/2ML SOLUTION: Performed by: NURSE ANESTHETIST, CERTIFIED REGISTERED

## 2017-12-07 PROCEDURE — 27570 FIXATION OF KNEE JOINT: CPT | Performed by: ORTHOPAEDIC SURGERY

## 2017-12-07 PROCEDURE — 25010000002 MIDAZOLAM PER 1 MG: Performed by: NURSE ANESTHETIST, CERTIFIED REGISTERED

## 2017-12-07 RX ORDER — SODIUM CHLORIDE 0.9 % (FLUSH) 0.9 %
3 SYRINGE (ML) INJECTION AS NEEDED
Status: DISCONTINUED | OUTPATIENT
Start: 2017-12-07 | End: 2017-12-07 | Stop reason: HOSPADM

## 2017-12-07 RX ORDER — FENTANYL CITRATE 50 UG/ML
INJECTION, SOLUTION INTRAMUSCULAR; INTRAVENOUS AS NEEDED
Status: DISCONTINUED | OUTPATIENT
Start: 2017-12-07 | End: 2017-12-07 | Stop reason: SURG

## 2017-12-07 RX ORDER — METHYLPREDNISOLONE ACETATE 80 MG/ML
INJECTION, SUSPENSION INTRA-ARTICULAR; INTRALESIONAL; INTRAMUSCULAR; SOFT TISSUE AS NEEDED
Status: DISCONTINUED | OUTPATIENT
Start: 2017-12-07 | End: 2017-12-07 | Stop reason: HOSPADM

## 2017-12-07 RX ORDER — ONDANSETRON 2 MG/ML
4 INJECTION INTRAMUSCULAR; INTRAVENOUS ONCE AS NEEDED
Status: DISCONTINUED | OUTPATIENT
Start: 2017-12-07 | End: 2017-12-07 | Stop reason: HOSPADM

## 2017-12-07 RX ORDER — MIDAZOLAM HYDROCHLORIDE 1 MG/ML
INJECTION INTRAMUSCULAR; INTRAVENOUS AS NEEDED
Status: DISCONTINUED | OUTPATIENT
Start: 2017-12-07 | End: 2017-12-07 | Stop reason: SURG

## 2017-12-07 RX ORDER — HYDROCODONE BITARTRATE AND ACETAMINOPHEN 7.5; 325 MG/1; MG/1
TABLET ORAL
Status: COMPLETED
Start: 2017-12-07 | End: 2017-12-07

## 2017-12-07 RX ORDER — HYDROCODONE BITARTRATE AND ACETAMINOPHEN 7.5; 325 MG/1; MG/1
2 TABLET ORAL ONCE AS NEEDED
Status: COMPLETED | OUTPATIENT
Start: 2017-12-07 | End: 2017-12-07

## 2017-12-07 RX ORDER — LIDOCAINE HYDROCHLORIDE 10 MG/ML
INJECTION, SOLUTION INFILTRATION; PERINEURAL AS NEEDED
Status: DISCONTINUED | OUTPATIENT
Start: 2017-12-07 | End: 2017-12-07 | Stop reason: HOSPADM

## 2017-12-07 RX ORDER — MORPHINE SULFATE 2 MG/ML
2 INJECTION, SOLUTION INTRAMUSCULAR; INTRAVENOUS
Status: CANCELLED | OUTPATIENT
Start: 2017-12-07 | End: 2017-12-07

## 2017-12-07 RX ORDER — SODIUM CHLORIDE, SODIUM LACTATE, POTASSIUM CHLORIDE, CALCIUM CHLORIDE 600; 310; 30; 20 MG/100ML; MG/100ML; MG/100ML; MG/100ML
1000 INJECTION, SOLUTION INTRAVENOUS CONTINUOUS PRN
Status: DISCONTINUED | OUTPATIENT
Start: 2017-12-07 | End: 2017-12-07 | Stop reason: HOSPADM

## 2017-12-07 RX ORDER — LIDOCAINE HYDROCHLORIDE 10 MG/ML
INJECTION, SOLUTION INFILTRATION; PERINEURAL
Status: DISCONTINUED
Start: 2017-12-07 | End: 2017-12-07 | Stop reason: HOSPADM

## 2017-12-07 RX ORDER — METHYLPREDNISOLONE ACETATE 80 MG/ML
INJECTION, SUSPENSION INTRA-ARTICULAR; INTRALESIONAL; INTRAMUSCULAR; SOFT TISSUE
Status: DISCONTINUED
Start: 2017-12-07 | End: 2017-12-07 | Stop reason: HOSPADM

## 2017-12-07 RX ORDER — HYDROCODONE BITARTRATE AND ACETAMINOPHEN 7.5; 325 MG/1; MG/1
1 TABLET ORAL NIGHTLY PRN
Qty: 60 TABLET | Refills: 0 | Status: SHIPPED | COMMUNITY
Start: 2017-12-07 | End: 2018-01-17 | Stop reason: SDUPTHER

## 2017-12-07 RX ORDER — FAMOTIDINE 10 MG/ML
INJECTION, SOLUTION INTRAVENOUS
Status: DISCONTINUED
Start: 2017-12-07 | End: 2017-12-07 | Stop reason: HOSPADM

## 2017-12-07 RX ORDER — PROPOFOL 10 MG/ML
VIAL (ML) INTRAVENOUS AS NEEDED
Status: DISCONTINUED | OUTPATIENT
Start: 2017-12-07 | End: 2017-12-07 | Stop reason: SURG

## 2017-12-07 RX ADMIN — MIDAZOLAM HYDROCHLORIDE 2 MG: 1 INJECTION, SOLUTION INTRAMUSCULAR; INTRAVENOUS at 07:35

## 2017-12-07 RX ADMIN — PROPOFOL 50 MG: 10 INJECTION, EMULSION INTRAVENOUS at 07:42

## 2017-12-07 RX ADMIN — PROPOFOL 50 MG: 10 INJECTION, EMULSION INTRAVENOUS at 07:38

## 2017-12-07 RX ADMIN — HYDROCODONE BITARTRATE AND ACETAMINOPHEN 2 TABLET: 7.5; 325 TABLET ORAL at 08:32

## 2017-12-07 RX ADMIN — FENTANYL CITRATE 100 MCG: 50 INJECTION, SOLUTION INTRAMUSCULAR; INTRAVENOUS at 07:35

## 2017-12-07 RX ADMIN — PROPOFOL 50 MG: 10 INJECTION, EMULSION INTRAVENOUS at 07:45

## 2017-12-07 RX ADMIN — SODIUM CHLORIDE, POTASSIUM CHLORIDE, SODIUM LACTATE AND CALCIUM CHLORIDE 1000 ML: 600; 310; 30; 20 INJECTION, SOLUTION INTRAVENOUS at 06:34

## 2017-12-07 NOTE — H&P (VIEW-ONLY)
Subjective   Patient ID: Boris Jerry is a 67 y.o.  male is here today for follow-up for right knee stiffness Post-op and Pain of the Right Knee         History of Present Illness      Patient presents for continued right knee pain and stiffness.  He denies injury or trauma.  He states he has performed home exercises as well as outpatient therapy routinely with no improvement in his right knee decreased range of motion.  Patient had right total knee arthroplasty 10/10/2017 by Dr. Cruz.    Past Medical History:   Diagnosis Date   • Anxiety    • Arthritis     IN KNEES   • Cancer     BASAL CELL REMOVED    • Cataract     REPORTS BILATERAL CATARACTS IN EARLY STAGES   • Diabetes mellitus    • H/O corticosteroid therapy     left knee injection, right shoulder, right knee   • Hearing loss in right ear     REPORTS MILD FROM PERFURATED EAR DRUM   • History of exercise stress test     REPORTS APPROXIMATELY 20 YEARS AGO AND THAT ALL WAS WNL'S AT THAT TIME   • Hypertension    • Osteoarthritis of knee    • Pseudogout of knee    • Seasonal allergies    • Wears contact lenses     TO BRING IN GLASSES THE DOS         Past Surgical History:   Procedure Laterality Date   • COLONOSCOPY     • HERNIA REPAIR Right     INGUINAL   • [place] TOTAL KNEE ARTHROPLASTY Right 10/10/2017    Procedure: ELECTIVE RIGHT TOTAL KNEE ARTHROPLASTY (S&N);  Surgeon: Eric Cruz MD;  Location: [facility];  Service: Orthopedics   • SKIN BIOPSY     • VASECTOMY     • WISDOM TOOTH EXTRACTION      REPORTS EXTRACTED X3       Family History   Problem Relation Age of Onset   • Lymphoma Father    • Melanoma Son        Social History     Social History   • Marital status:      Spouse name: N/A   • Number of children: N/A   • Years of education: N/A     Occupational History   • Not on file.     Social History Main Topics   • Smoking status: Former Smoker     Packs/day: 1.00     Years: 5.00     Types: Cigarettes     Quit date: 1/1/1973   • Smokeless  "tobacco: Never Used   • Alcohol use Yes      Comment: REPORTS BOURBON, 3 SHOTS, 4-5 TIMES WEEKLY.  REPORTS NO ETOH SINCE SURGERY DONE 10-10-17.   • Drug use: No   • Sexual activity: Yes     Partners: Female     Other Topics Concern   • Not on file     Social History Narrative       No Known Allergies    Review of Systems   Constitutional: Negative for fever.   HENT: Negative for voice change.    Eyes: Negative for visual disturbance.   Respiratory: Negative for shortness of breath.    Cardiovascular: Negative for chest pain.   Gastrointestinal: Negative for abdominal distention and abdominal pain.   Genitourinary: Negative for dysuria.   Musculoskeletal: Positive for arthralgias. Negative for gait problem and joint swelling.   Skin: Negative for rash.   Neurological: Negative for speech difficulty.   Hematological: Does not bruise/bleed easily.   Psychiatric/Behavioral: Negative for confusion.       Objective   Resp 18  Ht 70\" (177.8 cm)  Wt 192 lb (87.1 kg)  BMI 27.55 kg/m2   Physical Exam   Constitutional: He is oriented to person, place, and time. He appears well-nourished.   Neck: No tracheal deviation present.   Cardiovascular: Normal rate.    Pulmonary/Chest: No respiratory distress.   Musculoskeletal:        Right knee: He exhibits decreased range of motion and swelling. He exhibits no ecchymosis and no erythema. Tenderness found. Medial joint line and lateral joint line tenderness noted.   Neurological: He is alert and oriented to person, place, and time.   Skin: No rash noted.   Psychiatric: He has a normal mood and affect. His behavior is normal.   Vitals reviewed.    Right Knee Exam     Range of Motion   Right knee extension: 10.   Right knee flexion: 71.     Other   Erythema: absent  Scars: present  Sensation: normal  Pulse: present  Swelling: mild           Extremity DVT signs are Negative on physical exam with negative Otis sign, with no calf pain, with no palpable cords, with no increased pain " with passive stretch/extension and with no skin tone change   Neurologic Exam     Mental Status   Oriented to person, place, and time.      Right Knee Exam     Tenderness   The patient is experiencing tenderness in the medial joint line, lateral joint line.               Assessment/Plan   Independent Review of Radiographic Studies:    No new imaging done today.  Reviewed at a prior visit.      Procedures       Boris was seen today for post-op and pain.    Diagnoses and all orders for this visit:    Arthrofibrosis of knee joint, right  -     Case Request; Standing  -     Follow anesthesia standing orders.  -     Provide instructions to patient regarding NPO status  -     Obtain informed consent  -     Clorhexidine skin prep  -     CBC and Differential  -     Comprehensive metabolic panel  -     XR chest 2 vw  -     Follow Anesthesia Guidelines / Standing Orders; Standing  -     Notify physician (specify); Standing  -     ceFAZolin (ANCEF) 2 g in sodium chloride 0.9 % 100 mL IVPB; Infuse 2 g into a venous catheter On Call to the Operating Room.  -     famotidine (PEPCID) injection 20 mg; Infuse 2 mL into a venous catheter 60 Minutes Prior to Surgery.  -     Ambulatory Referral to Physical Therapy Evaluate and treat  -     Case Request  -     CBC Auto Differential    Pre-op testing  -     Case Request; Standing  -     Follow anesthesia standing orders.  -     Provide instructions to patient regarding NPO status  -     Obtain informed consent  -     Clorhexidine skin prep  -     CBC and Differential  -     Comprehensive metabolic panel  -     XR chest 2 vw  -     Follow Anesthesia Guidelines / Standing Orders; Standing  -     Notify physician (specify); Standing  -     ceFAZolin (ANCEF) 2 g in sodium chloride 0.9 % 100 mL IVPB; Infuse 2 g into a venous catheter On Call to the Operating Room.  -     famotidine (PEPCID) injection 20 mg; Infuse 2 mL into a venous catheter 60 Minutes Prior to Surgery.  -     Ambulatory  Referral to Physical Therapy Evaluate and treat  -     Case Request  -     CBC Auto Differential    Status post total right knee replacement using cement       Regular exercise as tolerated  Orthopedic activities reviewed and patient expressed appreciation  Discussion of orthopedic goals  Risk, benefits, and merits of treatment alternatives reviewed with the patient and questions answered  The nature of the proposed surgery reviewed with the patient including risks, benefits, rehabilitation, recovery timeframe, and outcome expectations    Recommendations/Plan:  Patient is encouraged to call or return for any issues or concerns.    Plan for right knee manipulation    Patient agreeable to call or return sooner for any concerns.

## 2017-12-07 NOTE — PLAN OF CARE
Problem: Perioperative Period (Adult)  Goal: Signs and Symptoms of Listed Potential Problems Will be Absent or Manageable (Perioperative Period)  Outcome: Ongoing (interventions implemented as appropriate)    12/07/17 0610   Perioperative Period   Problems Assessed (Perioperative Period) all   Problems Present (Perioperative Period) pain

## 2017-12-07 NOTE — INTERVAL H&P NOTE
H&P reviewed. The patient was examined and there are no changes to the H&P.     /85 (BP Location: Left arm, Patient Position: Sitting)  Pulse 92  Temp 97.7 °F (36.5 °C) (Temporal Artery )   Resp 19  SpO2 97%      Eric Cruz MD  12/7/2017  7:29 AM

## 2017-12-07 NOTE — DISCHARGE INSTRUCTIONS
Please follow all post op instructions and follow up appointment time from your physician's office included in your discharge packet.  .   Keep the affected extremity elevated above  level of the heart.  Use your ice pack as instructed, do not use continuously.  Use your crutches and or sling as directed  Follow your physicians instructions as previously directed.No pushing, pulling, tugging,  heavy lifting, or strenuous activity.  No major decision making, driving, or drinking alcoholic beverages for 24 hours. ( due to the medications you have  received)  Always use good hand hygiene/washing techniques.  NO driving while taking pain medications.To assist you in voiding:  Drink plenty of fluids  Listen to running water while attempting to void.    If you are unable to urinate and you have an uncomfortable urge to void or it has been   6 hours since you were discharged, return to the Emergency Room

## 2017-12-07 NOTE — ANESTHESIA PREPROCEDURE EVALUATION
Anesthesia Evaluation     Patient summary reviewed and Nursing notes reviewed   no history of anesthetic complications:  NPO Solid Status: > 8 hours  NPO Liquid Status: > 8 hours     Airway   Mallampati: I  TM distance: >3 FB  Neck ROM: full  no difficulty expected  Dental - normal exam     Pulmonary - negative pulmonary ROS and normal exam   Cardiovascular - normal exam    Rhythm: regular  Rate: normal    (+) hypertension well controlled, hyperlipidemia      Neuro/Psych- negative ROS  GI/Hepatic/Renal/Endo    (+)  diabetes mellitus (fsbs 124) type 2,     Musculoskeletal     Abdominal  - normal exam    Abdomen: soft.  Bowel sounds: normal.   Substance History - negative use     OB/GYN negative ob/gyn ROS         Other   (+) arthritis   history of cancer (SKIN) remission                                      Anesthesia Plan    ASA 2     regional, spinal and MAC   (Risks discussed , including but not limited to:  Headache, itching, n&v, infection, failure, decreased blood pressure, permanent/chronic back pain, nerve damage, paralysis, etc. All questions answered and informed consent obtained.       Adductor Canal onQ cath with ipac vs tibial pnb  Risk vs Benefits discussed with patient to include infection, bleeding at the site, paresthesia, and incomplete analgesia.  )  intravenous induction   Anesthetic plan and risks discussed with patient.

## 2017-12-07 NOTE — ANESTHESIA POSTPROCEDURE EVALUATION
Patient: Boris Jerry    Procedure Summary     Date Anesthesia Start Anesthesia Stop Room / Location    12/07/17 0734 0749  [facility]       Procedure Diagnosis Surgeon Provider    ELECTIVE RIGHT KNEE MANIPULATION UNDER ANESTHESIA WITH INJECTION (Right Knee) Pre-op testing; Arthrofibrosis of knee joint, right  (Pre-op testing [Z01.818]; Arthrofibrosis of knee joint, right [M24.661]) MD Ford Gibbs CRNA          Anesthesia Type: regional, spinal, MAC  Last vitals  BP   153/85 (12/07/17 0619)   Temp   97.7 °F (36.5 °C) (12/07/17 0619)   Pulse   92 (12/07/17 0619)   Resp   19 (12/07/17 0619)     SpO2   97 % (12/07/17 0619)     Post Anesthesia Care and Evaluation    Patient location during evaluation: PACU  Patient participation: complete - patient participated  Level of consciousness: awake and alert  Pain score: 0  Pain management: satisfactory to patient  Airway patency: patent  Anesthetic complications: No anesthetic complications  PONV Status: none  Cardiovascular status: stable and acceptable  Respiratory status: acceptable  Hydration status: acceptable

## 2017-12-07 NOTE — OP NOTE
[place] [address] [facility] [address]  [place]  [address] [phone]      OPERATIVE REPORT      PATIENT NAME:   Boris Jerry                            YOB: 1950      PREOP DIAGNOSIS:    Arthrofibrosis after right total knee replacement.    POSTOP DIAGNOSIS:    Same.    PROCEDURE:   Right knee manipulation under anesthesia and injection for arthrofibrosis.    SURGEON:    Eric Cruz MD    OPERATIVE TEAM:   Circulator: Leigha Patterson RN       Scrub Person: Rosa Maria Abdalla    ANESTHETIST:   MARQUISE: Ford Navarrete CRNA    ANESTHESIA:   Monitored anesthesia control sedation and right knee intraarticular local block.    ESTIM BLOOD LOSS:  Trace.    SPECIMENS:                None.    COMPLICATIONS:      None.    DISPOSITION:              Stable to recovery.      PRE-MANIPULATION MOTION:       0 - 65 degrees active, 0 - 70 degrees passive.  POST-MANIPULATION MOTION:    0 - 110 degrees gravity, 0 - 120 degrees passive.      INDICATION AND NARRATIVE:  The patient is recovering from a total knee replacement.  Progress and therapy has been limited by restriction of passive and active motion.  Patella mobility has been limited and findings are consistent with arthrofibrosis.  The patient has had persistent knee joint stiffness over time.  Soft tissue tightness was noted around the knee, though there was no effusion, fluctuance, redness or warmth, and no signs of infection.  The patient has reached a plateau in physical therapy.  Treatment options, including elective manipulation of the knee and injection, have been discussed, and the patient wishes to proceed.   Risks, benefits and alternatives have been discussed and informed consent has been obtained.  Risks were discussed including, but not limited to anesthesia, infection, fracture, persistent stiffness or limitation of the knee condition.  Goals include the potential for pain relief, improved mobility of the  knee and improved overall outcome, when combined with post-op therapy.  The patient presents for the planned procedure and daily therapy arrangements made this week.    Antibiotic prophylaxis was given.  Anesthesia was effective and well tolerated.  Pre-manipulation knee range of motion was performed.  After knee manipulation there was a good release of the soft tissues, particularly the patella and extensor mechanism, and there was much improved knee passive range of motion.   The knee was easily cycled through the final range of motion.  After sterile prep of the anterior knee, a knee joint injection of 80 mg in one ml of methylprednisolone plus 19 ml of plain 1% lidocaine was given and a band-aid applied.  There was no hematoma or any complication of the procedure.  The patient was transferred in stable condition to recovery.  Plans include immediate return to therapy for range of motion and conditioning program.

## 2017-12-19 ENCOUNTER — OFFICE VISIT (OUTPATIENT)
Dept: ORTHOPEDIC SURGERY | Facility: CLINIC | Age: 67
End: 2017-12-19

## 2017-12-19 VITALS — RESPIRATION RATE: 18 BRPM | WEIGHT: 192 LBS | HEIGHT: 70 IN | BODY MASS INDEX: 27.49 KG/M2

## 2017-12-19 DIAGNOSIS — Z96.651 STATUS POST TOTAL RIGHT KNEE REPLACEMENT USING CEMENT: ICD-10-CM

## 2017-12-19 DIAGNOSIS — M24.661 ARTHROFIBROSIS OF KNEE JOINT, RIGHT: Primary | ICD-10-CM

## 2017-12-19 PROCEDURE — 99024 POSTOP FOLLOW-UP VISIT: CPT | Performed by: PHYSICIAN ASSISTANT

## 2017-12-19 RX ORDER — PREDNISONE 20 MG/1
20 TABLET ORAL 2 TIMES DAILY
Qty: 10 TABLET | Refills: 0 | Status: SHIPPED | OUTPATIENT
Start: 2017-12-19

## 2017-12-19 NOTE — PROGRESS NOTES
Subjective   Patient ID: Boris Jerry is a 67 y.o. male is here today for follow-up for right knee s/p TKA and manipulaton Pain and Follow-up of the Right Knee         History of Present Illness  Patient is following up in regards to right total knee arthroplasty status post manipulation on 12/7/2017.  He states he still has discomfort to the right knee feels he is making some improvements.  He is attending physical therapy.  He still takes Advil and takes hydrocodone at night as needed  Pain Score: 2 (1-4)  Pain Location: Knee  Pain Orientation: Right     Pain Descriptors: Aching, Burning              Aggravating Factors: Standing, Walking        Pain Intervention(s): Rest          Past Medical History:   Diagnosis Date   • Anxiety    • Arthritis     IN KNEES   • Cancer     BASAL CELL REMOVED    • Cataract     REPORTS BILATERAL CATARACTS IN EARLY STAGES   • Diabetes mellitus    • H/O corticosteroid therapy     left knee injection, right shoulder, right knee   • Hearing loss in right ear     REPORTS MILD FROM PERFURATED EAR DRUM   • History of exercise stress test     REPORTS APPROXIMATELY 20 YEARS AGO AND THAT ALL WAS WNL'S AT THAT TIME   • Hypertension    • Osteoarthritis of knee    • Pseudogout of knee    • Seasonal allergies    • Wears contact lenses     TO BRING IN GLASSES THE DOS         Past Surgical History:   Procedure Laterality Date   • COLONOSCOPY     • HERNIA REPAIR Right     INGUINAL   • KNEE JOINT MANIPULATION Right 12/7/2017    Procedure: ELECTIVE RIGHT KNEE MANIPULATION UNDER ANESTHESIA WITH INJECTION;  Surgeon: Eric Cruz MD;  Location: [facility] OR;  Service:    • [place] TOTAL KNEE ARTHROPLASTY Right 10/10/2017    Procedure: ELECTIVE RIGHT TOTAL KNEE ARTHROPLASTY (S&N);  Surgeon: Eric Cruz MD;  Location: [facility] OR;  Service: Orthopedics   • SKIN BIOPSY     • VASECTOMY     • WISDOM TOOTH EXTRACTION      REPORTS EXTRACTED X3       Family History   Problem Relation Age of Onset   •  "Lymphoma Father    • Melanoma Son        Social History     Social History   • Marital status:      Spouse name: N/A   • Number of children: N/A   • Years of education: N/A     Occupational History   • Not on file.     Social History Main Topics   • Smoking status: Former Smoker     Packs/day: 1.00     Years: 5.00     Types: Cigarettes     Quit date: 1/1/1973   • Smokeless tobacco: Never Used   • Alcohol use Yes      Comment: REPORTS BOURBON, 3 SHOTS, 4-5 TIMES WEEKLY.  REPORTS NO ETOH SINCE SURGERY DONE 10-10-17.   • Drug use: No   • Sexual activity: Yes     Partners: Female     Other Topics Concern   • Not on file     Social History Narrative       No Known Allergies    Review of Systems   Constitutional: Negative for fever.   HENT: Negative for voice change.    Eyes: Negative for visual disturbance.   Respiratory: Negative for shortness of breath.    Cardiovascular: Negative for chest pain.   Gastrointestinal: Negative for abdominal distention and abdominal pain.   Genitourinary: Negative for dysuria.   Musculoskeletal: Positive for arthralgias. Negative for gait problem and joint swelling.   Skin: Negative for rash.   Neurological: Negative for speech difficulty.   Hematological: Does not bruise/bleed easily.   Psychiatric/Behavioral: Negative for confusion.       Objective   Resp 18  Ht 177.8 cm (70\")  Wt 87.1 kg (192 lb)  BMI 27.55 kg/m2   Physical Exam   Constitutional: He is oriented to person, place, and time. He appears well-nourished.   Neck: No tracheal deviation present.   Pulmonary/Chest: Effort normal. No respiratory distress.   Musculoskeletal:        Right knee: He exhibits no swelling, no effusion, no ecchymosis, no deformity and no erythema. Tenderness found. Medial joint line tenderness noted.   Neurological: He is alert and oriented to person, place, and time.   Psychiatric: He has a normal mood and affect. His behavior is normal.   Vitals reviewed.    Right Knee Exam     Range of " Motion   Right knee extension: 5.   Right knee flexion: 91.     Other   Erythema: absent  Scars: present  Other tests: no effusion present           Extremity DVT signs are Negative on physical exam with negative Otis sign, with no calf pain, with no palpable cords, with no increased pain with passive stretch/extension and with no skin tone change   Neurologic Exam     Mental Status   Oriented to person, place, and time.      Right Knee Exam     Tenderness   The patient is experiencing tenderness in the medial joint line.               Assessment/Plan   Independent Review of Radiographic Studies:    No new imaging done today.      Procedures       Boris was seen today for pain and follow-up.    Diagnoses and all orders for this visit:    Arthrofibrosis of knee joint, right    Status post total right knee replacement using cement    Other orders  -     predniSONE (DELTASONE) 20 MG tablet; Take 1 tablet by mouth 2 (Two) Times a Day.     Orthopedic activities reviewed and patient expressed appreciation  Discussion of orthopedic goals  Risk, benefits, and merits of treatment alternatives reviewed with the patient and questions answered    Recommendations/Plan:  Exercise, medications, injections, other patient advice, and return appointment as noted.  Patient is encouraged to call or return for any issues or concerns.    FU in 4 weeks or PRN   Continue PT  Patient agreeable to call or return sooner for any concerns.

## 2018-01-17 ENCOUNTER — OFFICE VISIT (OUTPATIENT)
Dept: ORTHOPEDIC SURGERY | Facility: CLINIC | Age: 68
End: 2018-01-17

## 2018-01-17 VITALS — WEIGHT: 192 LBS | HEIGHT: 70 IN | BODY MASS INDEX: 27.49 KG/M2 | RESPIRATION RATE: 16 BRPM

## 2018-01-17 DIAGNOSIS — M24.661 ARTHROFIBROSIS OF KNEE JOINT, RIGHT: Primary | ICD-10-CM

## 2018-01-17 DIAGNOSIS — Z96.651 STATUS POST TOTAL RIGHT KNEE REPLACEMENT USING CEMENT: ICD-10-CM

## 2018-01-17 DIAGNOSIS — M11.20 PSEUDOGOUT: ICD-10-CM

## 2018-01-17 DIAGNOSIS — M17.0 PRIMARY OSTEOARTHRITIS OF BOTH KNEES: ICD-10-CM

## 2018-01-17 DIAGNOSIS — M19.90 INFLAMMATORY OSTEOARTHRITIS: ICD-10-CM

## 2018-01-17 PROCEDURE — 99024 POSTOP FOLLOW-UP VISIT: CPT | Performed by: ORTHOPAEDIC SURGERY

## 2018-01-17 RX ORDER — HYDROCODONE BITARTRATE AND ACETAMINOPHEN 7.5; 325 MG/1; MG/1
1 TABLET ORAL EVERY 6 HOURS PRN
Qty: 60 TABLET | Refills: 0 | Status: CANCELLED | OUTPATIENT
Start: 2018-01-17

## 2018-01-17 RX ORDER — HYDROCODONE BITARTRATE AND ACETAMINOPHEN 7.5; 325 MG/1; MG/1
1 TABLET ORAL NIGHTLY PRN
Qty: 30 TABLET | Refills: 0 | Status: SHIPPED | OUTPATIENT
Start: 2018-01-17

## 2018-01-17 RX ORDER — HYDROCODONE BITARTRATE AND ACETAMINOPHEN 7.5; 325 MG/1; MG/1
1 TABLET ORAL NIGHTLY PRN
Qty: 30 TABLET | Refills: 0 | Status: SHIPPED | OUTPATIENT
Start: 2018-01-17 | End: 2018-01-17

## 2018-01-17 NOTE — PROGRESS NOTES
Subjective   Patient ID: Boris Jerry is a 67 y.o. right hand dominant male is here today for a post-operative visit  Post-op of the Right Knee     History of Present Illness     Pain controlled: [] no   [x] yes   Medication refill requested: [] no   [x] yes    Patient compliant with instructions: [] no   [x] yes   Other: Reports slow progress since surgery. Patient notes some discomfort and weather makes this worse. Patient notes increased travel the last few weeks to [place] for family reasons. He does stop every 2 hours and stretch.  Patient notes attending physical therapy two days a week and they are working on flexion and extension. Patient notes tightness in quad muscle.  Overall he emphasized that the knee is better than before the manipulation.     Past Medical History:   Diagnosis Date   • Anxiety    • Arthritis     IN KNEES   • Cancer     BASAL CELL REMOVED    • Cataract     REPORTS BILATERAL CATARACTS IN EARLY STAGES   • Diabetes mellitus    • H/O corticosteroid therapy     left knee injection, right shoulder, right knee   • Hearing loss in right ear     REPORTS MILD FROM PERFURATED EAR DRUM   • History of exercise stress test     REPORTS APPROXIMATELY 20 YEARS AGO AND THAT ALL WAS WNL'S AT THAT TIME   • Hypertension    • Osteoarthritis of knee    • Pseudogout of knee    • Seasonal allergies    • Wears contact lenses     TO BRING IN GLASSES THE DOS         Past Surgical History:   Procedure Laterality Date   • COLONOSCOPY     • HERNIA REPAIR Right     INGUINAL   • KNEE JOINT MANIPULATION Right 12/7/2017    Procedure: ELECTIVE RIGHT KNEE MANIPULATION UNDER ANESTHESIA WITH INJECTION;  Surgeon: Eric Cruz MD;  Location: [facility];  Service:    • [place] TOTAL KNEE ARTHROPLASTY Right 10/10/2017    Procedure: ELECTIVE RIGHT TOTAL KNEE ARTHROPLASTY (S&N);  Surgeon: Eric Cruz MD;  Location: [facility];  Service: Orthopedics   • SKIN BIOPSY     • VASECTOMY     • WISDOM TOOTH EXTRACTION       "REPORTS EXTRACTED X3       No Known Allergies    Review of Systems   Constitutional: Negative for fever.   HENT: Negative for voice change.    Eyes: Negative for visual disturbance.   Respiratory: Negative for shortness of breath.    Cardiovascular: Negative for chest pain.   Gastrointestinal: Negative for abdominal distention and abdominal pain.   Genitourinary: Negative for dysuria.   Musculoskeletal: Positive for arthralgias. Negative for gait problem and joint swelling.   Skin: Negative for rash.   Neurological: Negative for speech difficulty.   Hematological: Does not bruise/bleed easily.   Psychiatric/Behavioral: Negative for confusion.       Objective   Resp 16  Ht 177.8 cm (70\")  Wt 87.1 kg (192 lb)  BMI 27.55 kg/m2      Signs of infection: [x] no                    [] yes   Drainage: [x] no                    [] yes   Incision: [] healing well     [x]healed well   Motor exam intact: [] no                    [x] yes   Neurovascular exam intact: [] no                    [x] yes   Signs of compartment syndrome: [x] no                    [] yes   Signs of DVT: [x] no                    [] yes   Other: Persistent pain to the lateral side of the patella.     Physical Exam   Constitutional: He appears well-developed. No distress.   Musculoskeletal:        Right knee: He exhibits no effusion.   Neurological: He is alert. Gait normal.   Skin: Skin is warm and dry. No rash noted. No erythema.   Psychiatric: He has a normal mood and affect. His speech is normal.   Vitals reviewed.     Right Knee Exam     Tenderness   The patient is experiencing tenderness in the lateral retinaculum.    Range of Motion   Right knee extension: 3 degrees shy of full extension.   Right knee flexion: 90 degrees active, 95 degrees passive, and he reports achieving 110 degrees in one therapy session.     Muscle Strength     The patient has normal right knee strength.    Tests   Varus: negative  Valgus: negative  Patellar Apprehension: " negative    Other   Erythema: absent  Scars: present (healed well)  Pulse: present  Swelling: moderate (diffuse soft tissue swelling, not focal to joint.)  Other tests: no effusion present           Extremity DVT signs are Negative on physical exam with negative Otis sign, with no calf pain, with no palpable cords, with no increased pain with passive stretch/extension and with no skin tone change  Neurologic Exam     Mental Status   Attention: normal.   Speech: speech is normal   Level of consciousness: alert  Knowledge: good.     Motor Exam   Overall muscle tone: normal    Gait, Coordination, and Reflexes     Gait  Gait: normal (limp is just about resolved.)    Right Knee Exam     Muscle Strength   Normal right knee strength        Assessment/Plan   Independent Review of Radiographic Studies:    No new imaging done today.  Laboratory and Other Studies:  No new results reviewed today.   Medical Decision Making:    Fair progress though ongoing with residual symptoms.  Continue current management and any additional treatments and workup as outlined in plan.  Sequelae of arthrofibrosis after total knee replacement.     Procedures  [x] No procedures were performed in office today.     Boris was seen today for post-op.    Diagnoses and all orders for this visit:    Arthrofibrosis of knee joint, right    Pseudogout    Inflammatory osteoarthritis    Status post total right knee replacement using cement    Primary osteoarthritis of both knees    Other orders  -     Cancel: HYDROcodone-acetaminophen (NORCO) 7.5-325 MG per tablet; Take 1 tablet by mouth Every 6 (Six) Hours As Needed for pain  -     Discontinue: HYDROcodone-acetaminophen (NORCO) 7.5-325 MG per tablet; Take 1 tablet by mouth Every 6 (Six) Hours As Needed for pain  -     HYDROcodone-acetaminophen (NORCO) 7.5-325 MG per tablet; Take 1 tablet by mouth At Night As Needed (PRN PAIN).         Recommendations/Plan:     Sutures Staples or Pins [] Removed today  [x]  At prior visit  [] Plan removal later   Splint/cast applied: [x]no []SAC []LAC []SLC []LLC []PTB []Splint:    Brace: [x]not provided        []pt provided with:   Physical therapy: []not at this time    []home-based    [x]outpatient    Ultrasound: [x]not ordered         []order given to patient   Labs: [x]not ordered         []order given to patient   Weight Bearing status: [x]Full []WBAT []PWB []NWB []Other   Work/Activity status: [x]Regular as tolerated []Light []Sedentary []No use extr   Prescriptions: []None given today  [x]As Noted   Imaging at next appt: []Yes  [x]No           Additional instructions: Patient agreeable to return sooner for any new concern.     Regular exercise as tolerated  Orthopedic activities reviewed and patient expressed appreciation  Discussion of orthopedic goals  Risk, benefits, and merits of treatment alternatives reviewed with the patient and questions answered  Physical therapy referral given  Take prescribed medications as instructed only as tolerated  Ice, heat, and/or modalities as beneficial  After care and dental prophylaxis for joint replacement prosthesis  Guided on proper techniques for mobility, strength, agility and/or conditioning exercises  Discussed techniques for gait pattern.    Exercise, medications, injections, other patient advice, and return appointment as noted.  Brace: No brace was given at today's visit  Referral: No referrals made at today's visit  Test/Studies: No additional studies ordered.  Surgery: No surgery proposed at this visit.  Work/Activity Status: May perform usual activities as tolerated and No strenuous activity.  Patient is encouraged to call or return for any issues or concerns.    Return in about 2 months (around 3/17/2018) for Recheck.  Patient agreeable to call or return sooner for any concerns.      Scribed for Eric Cruz MD by Asia Guerra R.N.. 1/17/2018  10:48 AM

## 2018-03-19 ENCOUNTER — OFFICE VISIT (OUTPATIENT)
Dept: ORTHOPEDIC SURGERY | Facility: CLINIC | Age: 68
End: 2018-03-19

## 2018-03-19 ENCOUNTER — LAB (OUTPATIENT)
Dept: LAB | Facility: HOSPITAL | Age: 68
End: 2018-03-19
Attending: ORTHOPAEDIC SURGERY

## 2018-03-19 VITALS — WEIGHT: 192 LBS | RESPIRATION RATE: 16 BRPM | HEIGHT: 70 IN | BODY MASS INDEX: 27.49 KG/M2

## 2018-03-19 DIAGNOSIS — Z96.651 STATUS POST TOTAL RIGHT KNEE REPLACEMENT USING CEMENT: ICD-10-CM

## 2018-03-19 DIAGNOSIS — M11.20 PSEUDOGOUT: ICD-10-CM

## 2018-03-19 DIAGNOSIS — M19.90 INFLAMMATORY OSTEOARTHRITIS: ICD-10-CM

## 2018-03-19 DIAGNOSIS — M13.161 INFLAMMATION OF JOINT OF RIGHT KNEE: ICD-10-CM

## 2018-03-19 DIAGNOSIS — M13.161 INFLAMMATION OF JOINT OF RIGHT KNEE: Primary | ICD-10-CM

## 2018-03-19 LAB
ANISOCYTOSIS BLD QL: ABNORMAL
BASOPHILS # BLD MANUAL: 0.28 10*3/MM3 (ref 0–0.2)
BASOPHILS NFR BLD AUTO: 3 % (ref 0–2.5)
CRP SERPL-MCNC: <0.5 MG/DL (ref 0–1)
DEPRECATED RDW RBC AUTO: 43.2 FL (ref 37–54)
EOSINOPHIL # BLD MANUAL: 0.19 10*3/MM3 (ref 0–0.7)
EOSINOPHIL NFR BLD MANUAL: 2 % (ref 0–7)
ERYTHROCYTE [DISTWIDTH] IN BLOOD BY AUTOMATED COUNT: 13.4 % (ref 11.5–14.5)
ERYTHROCYTE [SEDIMENTATION RATE] IN BLOOD: 2 MM/HR (ref 0–15)
HCT VFR BLD AUTO: 43.9 % (ref 42–52)
HGB BLD-MCNC: 15 G/DL (ref 14–18)
LARGE PLATELETS: ABNORMAL
LYMPHOCYTES # BLD MANUAL: 1.96 10*3/MM3 (ref 0.6–3.4)
LYMPHOCYTES NFR BLD MANUAL: 14 % (ref 0–12)
LYMPHOCYTES NFR BLD MANUAL: 21 % (ref 10–50)
MCH RBC QN AUTO: 30.1 PG (ref 27–31)
MCHC RBC AUTO-ENTMCNC: 34.2 G/DL (ref 30–37)
MCV RBC AUTO: 88 FL (ref 80–94)
MONOCYTES # BLD AUTO: 1.31 10*3/MM3 (ref 0–0.9)
NEUTROPHILS # BLD AUTO: 5.04 10*3/MM3 (ref 2–6.9)
NEUTROPHILS NFR BLD MANUAL: 52 % (ref 37–80)
NEUTS BAND NFR BLD MANUAL: 2 % (ref 0–6)
OVALOCYTES BLD QL SMEAR: ABNORMAL
PLATELET # BLD AUTO: 266 10*3/MM3 (ref 130–400)
PMV BLD AUTO: 10.3 FL (ref 6–12)
POIKILOCYTOSIS BLD QL SMEAR: ABNORMAL
RBC # BLD AUTO: 4.99 10*6/MM3 (ref 4.7–6.1)
SMALL PLATELETS BLD QL SMEAR: ADEQUATE
VARIANT LYMPHS NFR BLD MANUAL: 6 % (ref 0–0)
WBC MORPH BLD: NORMAL
WBC NRBC COR # BLD: 9.33 10*3/MM3 (ref 4.8–10.8)

## 2018-03-19 PROCEDURE — 85007 BL SMEAR W/DIFF WBC COUNT: CPT

## 2018-03-19 PROCEDURE — 86140 C-REACTIVE PROTEIN: CPT

## 2018-03-19 PROCEDURE — 85027 COMPLETE CBC AUTOMATED: CPT

## 2018-03-19 PROCEDURE — 99213 OFFICE O/P EST LOW 20 MIN: CPT | Performed by: ORTHOPAEDIC SURGERY

## 2018-03-19 PROCEDURE — 85651 RBC SED RATE NONAUTOMATED: CPT

## 2018-03-19 PROCEDURE — 36415 COLL VENOUS BLD VENIPUNCTURE: CPT

## 2018-03-19 RX ORDER — SPIRONOLACTONE 25 MG/1
TABLET ORAL
Refills: 3 | COMMUNITY
Start: 2018-03-05

## 2018-03-19 RX ORDER — HYDROCHLOROTHIAZIDE 25 MG/1
TABLET ORAL
Refills: 3 | COMMUNITY
Start: 2018-03-05

## 2018-03-19 NOTE — PROGRESS NOTES
Subjective   Patient ID: Boris Jerry is a 67 y.o. right hand dominant male is here today for orthopaedic evaluation of recovery progress with treatment. and is here today for a treatment planning discussion.  Follow-up of the Right Knee       History of Present Illness    Progress Note:  Patient reports that with treatments and since the last visit, overall pain is decreased, strength is improved, and range of motion is improving.  Patient is currently using medication (Ibuprofen).   Physical therapy has been helpful.  Injection therapy has not recently been given..   Patient does not  present with recent labs, imaging or other studies for review.    He and his wife note that he has some residual mild or warmth as well as diffuse soft tissue swelling just above the knee.  However he notes that with therapy and home exercises he is slowly gaining more knee flexion motion.  He also has returned to all of his routine activities as well as some physical yardwork.  We reviewed options including labs to evaluate for inflammation and he is agreeable to proceed today.    Past Medical History:   Diagnosis Date   • Anxiety    • Arthritis     IN KNEES   • Cancer     BASAL CELL REMOVED    • Cataract     REPORTS BILATERAL CATARACTS IN EARLY STAGES   • Diabetes mellitus    • H/O corticosteroid therapy     left knee injection, right shoulder, right knee   • Hearing loss in right ear     REPORTS MILD FROM PERFURATED EAR DRUM   • History of exercise stress test     REPORTS APPROXIMATELY 20 YEARS AGO AND THAT ALL WAS WNL'S AT THAT TIME   • Hypertension    • Osteoarthritis of knee    • Pseudogout of knee    • Seasonal allergies    • Wears contact lenses     TO BRING IN GLASSES THE DOS         Past Surgical History:   Procedure Laterality Date   • COLONOSCOPY     • HERNIA REPAIR Right     INGUINAL   • KNEE JOINT MANIPULATION Right 12/7/2017    Procedure: ELECTIVE RIGHT KNEE MANIPULATION UNDER ANESTHESIA WITH INJECTION;  Surgeon:  "Eric Cruz MD;  Location: [facility] OR;  Service:    • [place] TOTAL KNEE ARTHROPLASTY Right 10/10/2017    Procedure: ELECTIVE RIGHT TOTAL KNEE ARTHROPLASTY (S&N);  Surgeon: Eric Cruz MD;  Location: [facility];  Service: Orthopedics   • SKIN BIOPSY     • VASECTOMY     • WISDOM TOOTH EXTRACTION      REPORTS EXTRACTED X3        Family History   Problem Relation Age of Onset   • Lymphoma Father    • Melanoma Son         Social History     Social History   • Marital status:      Spouse name: N/A   • Number of children: N/A   • Years of education: N/A     Occupational History   • Not on file.     Social History Main Topics   • Smoking status: Former Smoker     Packs/day: 1.00     Years: 5.00     Types: Cigarettes     Quit date: 1/1/1973   • Smokeless tobacco: Never Used   • Alcohol use Yes      Comment: REPORTS BOURBON, 3 SHOTS, 4-5 TIMES WEEKLY.  REPORTS NO ETOH SINCE SURGERY DONE 10-10-17.   • Drug use: No   • Sexual activity: Yes     Partners: Female     Other Topics Concern   • Not on file     Social History Narrative   • No narrative on file       No Known Allergies    Review of Systems   Constitutional: Negative for fever.   HENT: Negative for voice change.    Eyes: Negative for visual disturbance.   Respiratory: Negative for shortness of breath.    Cardiovascular: Negative for chest pain.   Gastrointestinal: Negative for abdominal distention and abdominal pain.   Genitourinary: Negative for dysuria.   Musculoskeletal: Positive for arthralgias. Negative for gait problem and joint swelling.   Skin: Negative for rash.   Neurological: Negative for speech difficulty.   Hematological: Does not bruise/bleed easily.   Psychiatric/Behavioral: Negative for confusion.         Objective   Resp 16   Ht 177.8 cm (70\")   Wt 87.1 kg (192 lb)   BMI 27.55 kg/m²     Physical Exam   Constitutional: He appears well-developed. No distress.   Neurological: He is alert. Gait normal.   Skin: Skin is warm and dry. No rash " noted. No erythema.   Psychiatric: He has a normal mood and affect. His speech is normal.   Vitals reviewed.    Right Knee Exam     Tenderness   The patient is experiencing tenderness in the medial retinaculum (Still some distal quadriceps and patella soft tissue tightness, slowly improved.).    Range of Motion   Extension: 0   Flexion: 100 (and 105 degrees passive flexion)     Muscle Strength     The patient has normal right knee strength.    Tests   Varus: negative  Valgus: negative    Other   Erythema: absent  Scars: present (healed pristine)  Pulse: present        Extremity DVT signs are Negative on physical exam with negative Otis sign, with no calf pain, with no palpable cords and with no skin tone change   Neurologic Exam     Mental Status   Attention: normal.   Speech: speech is normal   Level of consciousness: alert  Knowledge: good.     Motor Exam   Overall muscle tone: normal    Gait, Coordination, and Reflexes     Gait  Gait: normal    Right Knee Exam     Muscle Strength   Normal right knee strength        Assessment/Plan   Independent Review of Radiographic Studies:    No new imaging done today.  Laboratory and Other Studies:  No new results reviewed today.   Medical Decision Making:    Stable neurovascular exam.  Good progress, significantly improved.  Continue current plan, and management.  Sequelae of knee arthrofibrosis gradually responding to treatment and rehabilitation.  Residual inflammation in patient with history of psuedogout and screening labs recommended.     Procedures  [x]  No procedures were performed in office today.    Boris was seen today for follow-up.    Diagnoses and all orders for this visit:    Inflammation of joint of right knee  -     CBC With Manual Differential; Future  -     Sedimentation Rate; Future  -     C-reactive Protein; Future    Pseudogout  -     CBC With Manual Differential; Future  -     Sedimentation Rate; Future  -     C-reactive Protein;  Future    Inflammatory osteoarthritis  -     CBC With Manual Differential; Future  -     Sedimentation Rate; Future  -     C-reactive Protein; Future    Status post total right knee replacement using cement  -     CBC With Manual Differential; Future  -     Sedimentation Rate; Future  -     C-reactive Protein; Future       Regular exercise as tolerated  Orthopedic activities reviewed and patient expressed appreciation  Discussion of orthopedic goals  After care and dental prophylaxis for joint replacement prosthesis  Guided on proper techniques for mobility, strength, agility and/or conditioning exercises    Recommendations/Plan:  Exercise, medications, injections, other patient advice, and return appointment as noted.  Brace: No brace was given at today's visit  Referral: No referrals made at today's visit  Test/Studies: Labs CBC/diff, CRP, ESR  Surgery: No surgery proposed at this visit.  Work/Activity Status: May perform usual activities as tolerated  Patient is encouraged to call or return for any issues or concerns.     Return in about 3 months (around 6/19/2018) for Recheck.  Patient agreeable to call or return sooner for any concerns.      Addendum:  I reached Mr. Jerry at [phone] and reviewed his stable lab results of today and he was appreciative.  Labs:  ESR 2, CRP < 0.5, WBC 9.33 with normal range neutrophils.  Hgb 15.0, Hct 43.9.          Scribed for Eric Cruz MD by Asia Guerra R.N.. 3/19/2018  9:34 PM

## 2018-04-11 RX ORDER — TRAMADOL HYDROCHLORIDE 50 MG/1
50 TABLET ORAL EVERY 24 HOURS
Qty: 30 TABLET | Refills: 0 | Status: SHIPPED | OUTPATIENT
Start: 2018-04-11

## 2018-04-11 NOTE — TELEPHONE ENCOUNTER
Edmar and Asia,    Had suggested tramadol once daily or at night before bedtime to help him get better sleep, and to supplement the ibuprofen he already takes for pain and swelling.      So it would be tramadol 50 mg po every 24 hours prn, #30 and no refill.    Rigoberto Cruz MD

## 2018-06-20 ENCOUNTER — OFFICE VISIT (OUTPATIENT)
Dept: ORTHOPEDIC SURGERY | Facility: CLINIC | Age: 68
End: 2018-06-20

## 2018-06-20 VITALS — WEIGHT: 190 LBS | BODY MASS INDEX: 27.2 KG/M2 | RESPIRATION RATE: 18 BRPM | HEIGHT: 70 IN

## 2018-06-20 DIAGNOSIS — Z96.651 S/P TKR (TOTAL KNEE REPLACEMENT) USING CEMENT, RIGHT: ICD-10-CM

## 2018-06-20 DIAGNOSIS — M70.51 PES ANSERINUS BURSITIS OF RIGHT KNEE: Primary | ICD-10-CM

## 2018-06-20 DIAGNOSIS — M76.899 TENDINITIS OF QUADRICEPS: ICD-10-CM

## 2018-06-20 DIAGNOSIS — M62.89 HAMSTRING TIGHTNESS OF RIGHT LOWER EXTREMITY: ICD-10-CM

## 2018-06-20 PROCEDURE — 99213 OFFICE O/P EST LOW 20 MIN: CPT | Performed by: PHYSICIAN ASSISTANT

## 2018-06-20 RX ORDER — ATORVASTATIN CALCIUM 20 MG/1
TABLET, FILM COATED ORAL
COMMUNITY

## 2018-06-20 RX ORDER — SPIRONOLACTONE 25 MG/1
TABLET ORAL
COMMUNITY

## 2018-06-20 RX ORDER — EAR PLUGS
EACH OTIC (EAR)
COMMUNITY

## 2018-06-20 RX ORDER — MELOXICAM 15 MG/1
15 TABLET ORAL DAILY
Qty: 30 TABLET | Refills: 3 | Status: SHIPPED | OUTPATIENT
Start: 2018-06-20

## 2018-06-20 RX ORDER — LISINOPRIL 40 MG/1
TABLET ORAL
COMMUNITY

## 2018-06-20 RX ORDER — ACETAMINOPHEN 325 MG/1
TABLET ORAL
COMMUNITY

## 2018-06-20 RX ORDER — OMEGA-3 FATTY ACIDS/FISH OIL 300-1000MG
CAPSULE ORAL
COMMUNITY

## 2018-06-20 RX ORDER — AMLODIPINE BESYLATE 2.5 MG/1
TABLET ORAL
COMMUNITY

## 2018-06-20 NOTE — PROGRESS NOTES
Subjective   Patient ID: Boris Jerry is a 67 y.o. right hand dominant male  Follow-up and Pain of the Right Knee         History of Present Illness  Patient is following up at a routine follow-up visit in regards to right total knee replacement.  Patient denies injury or trauma.  He states he is still having some significant stiffness to the right knee.  He denies redness to the knee but states at times it feels warm.  He also attributes some of his knee discomfort to a recent [place] which required him to lift, push and pull.  He does take Advil at night which seems to help.  He describes his discomfort as a vice around the right knee.  He denies numbness or tingling to the lower extremity.  Denies back pain  Pain Score: 2  Pain Location: Knee  Pain Orientation: Right     Pain Descriptors: Aching, Tightness  Pain Frequency: Constant/continuous                    Pain Intervention(s): Rest, Cold applied          Past Medical History:   Diagnosis Date   • Anxiety    • Arthritis     IN KNEES   • Cancer     BASAL CELL REMOVED    • Cataract     REPORTS BILATERAL CATARACTS IN EARLY STAGES   • Diabetes mellitus    • H/O corticosteroid therapy     left knee injection, right shoulder, right knee   • Hearing loss in right ear     REPORTS MILD FROM PERFURATED EAR DRUM   • History of exercise stress test     REPORTS APPROXIMATELY 20 YEARS AGO AND THAT ALL WAS WNL'S AT THAT TIME   • Hypertension    • Osteoarthritis of knee    • Pseudogout of knee    • Seasonal allergies    • Wears contact lenses     TO BRING IN GLASSES THE DOS         Past Surgical History:   Procedure Laterality Date   • COLONOSCOPY     • HERNIA REPAIR Right     INGUINAL   • KNEE JOINT MANIPULATION Right 12/7/2017    Procedure: ELECTIVE RIGHT KNEE MANIPULATION UNDER ANESTHESIA WITH INJECTION;  Surgeon: Eric Cruz MD;  Location: [facility];  Service:    • CT TOTAL KNEE ARTHROPLASTY Right 10/10/2017    Procedure: ELECTIVE RIGHT TOTAL KNEE  ARTHROPLASTY (S&N);  Surgeon: Eric Cruz MD;  Location: [facility];  Service: Orthopedics   • SKIN BIOPSY     • VASECTOMY     • WISDOM TOOTH EXTRACTION      REPORTS EXTRACTED X3       Family History   Problem Relation Age of Onset   • Lymphoma Father    • Melanoma Son        Social History     Social History   • Marital status:      Spouse name: N/A   • Number of children: N/A   • Years of education: N/A     Occupational History   • Not on file.     Social History Main Topics   • Smoking status: Former Smoker     Packs/day: 1.00     Years: 5.00     Types: Cigarettes     Quit date: 1/1/1973   • Smokeless tobacco: Never Used   • Alcohol use Yes      Comment: REPORTS BOURBON, 3 SHOTS, 4-5 TIMES WEEKLY.  REPORTS NO ETOH SINCE SURGERY DONE 10-10-17.   • Drug use: No   • Sexual activity: Yes     Partners: Female     Other Topics Concern   • Not on file     Social History Narrative   • No narrative on file         Current Outpatient Prescriptions:   •  acetaminophen (TYLENOL) 325 MG tablet, Tylenol 325 mg tablet  Take 1 tablet every day by oral route at bedtime., Disp: , Rfl:   •  amLODIPine (NORVASC) 2.5 MG tablet, TK 1 T PO QD, Disp: , Rfl: 6  •  amLODIPine (NORVASC) 2.5 MG tablet, amlodipine 2.5 mg tablet  bid, Disp: , Rfl:   •  aspirin 81 MG EC tablet, Take 81 mg by mouth daily., Disp: , Rfl:   •  aspirin 81 MG tablet, aspirin 81 mg tablet  Take 1 tablet every day by oral route., Disp: , Rfl:   •  atorvastatin (LIPITOR) 20 MG tablet, TK 1 T PO QHS, Disp: , Rfl: 3  •  atorvastatin (LIPITOR) 20 MG tablet, atorvastatin 20 mg tablet  qd, Disp: , Rfl:   •  Cyanocobalamin (VITAMIN B-12 PO), Take 1,000 mcg by mouth Daily., Disp: , Rfl:   •  Cyanocobalamin (VITAMIN B-12) 1000 MCG/15ML liquid, Vitamin B-12 1,000 mcg tablet  Take 1 tablet every day by oral route., Disp: , Rfl:   •  hydrochlorothiazide (HYDRODIURIL) 25 MG tablet, TK 1/2 TO 1 T PO QD, Disp: , Rfl: 3  •  HYDROcodone-acetaminophen (NORCO) 7.5-325 MG  "per tablet, Take 1 tablet by mouth At Night As Needed (PRN PAIN)., Disp: 30 tablet, Rfl: 0  •  Ibuprofen (ADVIL) 200 MG capsule, Advil  Take 1 at hs prn, Disp: , Rfl:   •  lisinopril (PRINIVIL,ZESTRIL) 40 MG tablet, TK 1 T PO ONCE DAILY IN THE MORNING, Disp: , Rfl: 3  •  lisinopril (PRINIVIL,ZESTRIL) 40 MG tablet, lisinopril 40 mg tablet  qd, Disp: , Rfl:   •  meloxicam (MOBIC) 15 MG tablet, Take 1 tablet by mouth Daily., Disp: 30 tablet, Rfl: 3  •  ONE TOUCH ULTRA TEST test strip, TEST BLOOD SUGAR ONCE DAILY, Disp: , Rfl: 11  •  predniSONE (DELTASONE) 20 MG tablet, Take 1 tablet by mouth 2 (Two) Times a Day., Disp: 10 tablet, Rfl: 0  •  sitaGLIPtin-metFORMIN (JANUMET)  MG per tablet, Take 1 tablet by mouth Daily., Disp: , Rfl:   •  SITagliptin-MetFORMIN HCl ER (JANUMET XR)  MG tablet, Janumet XR 50 mg-1,000 mg tablet,extended release  Take 1 tablet every day by oral route., Disp: , Rfl:   •  spironolactone (ALDACTONE) 25 MG tablet, TK 1/2 TO 1 T PO QD, Disp: , Rfl: 3  •  spironolactone (ALDACTONE) 25 MG tablet, spironolactone 25 mg tablet  1/2-1 qd, Disp: , Rfl:   •  traMADol (ULTRAM) 50 MG tablet, Take 1 tablet by mouth Daily., Disp: 30 tablet, Rfl: 0    Allergies   Allergen Reactions   • Doxycycline Hives   • Hydrochlorothiazide Hives       Review of Systems   Constitutional: Negative for fever.   HENT: Negative for voice change.    Eyes: Negative for visual disturbance.   Respiratory: Negative for shortness of breath.    Cardiovascular: Negative for chest pain.   Gastrointestinal: Negative for abdominal distention and abdominal pain.   Genitourinary: Negative for dysuria.   Musculoskeletal: Positive for arthralgias. Negative for gait problem and joint swelling.   Skin: Negative for rash.   Neurological: Negative for speech difficulty.   Hematological: Does not bruise/bleed easily.   Psychiatric/Behavioral: Negative for confusion.       Objective   Resp 18   Ht 177.8 cm (70\")   Wt 86.2 kg (190 lb) "   BMI 27.26 kg/m²    Physical Exam   Constitutional: He is oriented to person, place, and time. He appears well-nourished.   Neck: No tracheal deviation present.   Musculoskeletal:        Right knee: He exhibits decreased range of motion and swelling. He exhibits no ecchymosis, no deformity and no erythema. Tenderness found. Medial joint line tenderness noted.        Right ankle: He exhibits no swelling. No tenderness.   Neurological: He is alert and oriented to person, place, and time.   Skin: Capillary refill takes less than 2 seconds.   Psychiatric: He has a normal mood and affect. His behavior is normal.   Vitals reviewed.    Right Knee Exam     Range of Motion   Right knee extension: 3.   Right knee flexion: 101.     Other   Erythema: absent  Scars: present  Sensation: normal  Swelling: mild           Extremity DVT signs are Negative on physical exam with negative Otis sign, with no calf pain, with no palpable cords, with no increased pain with passive stretch/extension and with no skin tone change   Neurologic Exam     Mental Status   Oriented to person, place, and time.      Right Knee Exam     Tenderness   The patient is experiencing tenderness in the medial joint line.           There is some right hamstring tightness.   Mild ttp and edema to the pes anserine bursa  ttp along the right quad tendon      Assessment/Plan   Independent Review of Radiographic Studies:    No new imaging done today.  Reviewed with patient at a prior visit.      Procedures       Boris was seen today for follow-up and pain.    Diagnoses and all orders for this visit:    Pes anserinus bursitis of right knee  -     Ambulatory Referral to Physical Therapy Evaluate and treat, Ortho; Electrotherapy, Heat; Ultrasound, Moist heat; Iontophoresis, E-stim, Tens (Home); Soft Tissue Mobilizaton    Tendinitis of quadriceps  -     Ambulatory Referral to Physical Therapy Evaluate and treat, Ortho; Electrotherapy, Heat; Ultrasound, Moist heat;  Iontophoresis, E-stim, Tens (Home); Soft Tissue Mobilizaton    Hamstring tightness of right lower extremity  -     Ambulatory Referral to Physical Therapy Evaluate and treat, Ortho; Electrotherapy, Heat; Ultrasound, Moist heat; Iontophoresis, E-stim, Tens (Home); Soft Tissue Mobilizaton    S/P TKR (total knee replacement) using cement, right  -     Ambulatory Referral to Physical Therapy Evaluate and treat, Ortho; Electrotherapy, Heat; Ultrasound, Moist heat; Iontophoresis, E-stim, Tens (Home); Soft Tissue Mobilizaton    Other orders  -     meloxicam (MOBIC) 15 MG tablet; Take 1 tablet by mouth Daily.       Orthopedic activities reviewed and patient expressed appreciation  Discussion of orthopedic goals  Risk, benefits, and merits of treatment alternatives reviewed with the patient and questions answered  Physical therapy referral given    Recommendations/Plan:  Exercise, medications, injections, other patient advice, and return appointment as noted.  Patient is encouraged to call or return for any issues or concerns.    I did review prior lab work ordered by Dr. Cruz.  There was no concern for infection.  I discussed with the patient the option of ordering a 3-phase bone scan to rule out loosening versus infection although this was low suspicion.    We also discussed the option of a second opinion-patient does not feel this is necessary at this time.      DC all other nsaids    Patient agreeable to call or return sooner for any concerns.

## 2019-05-06 RX ORDER — CEPHALEXIN 500 MG/1
500 CAPSULE ORAL TAKE AS DIRECTED
Qty: 4 CAPSULE | Refills: 0 | Status: SHIPPED | OUTPATIENT
Start: 2019-05-06

## (undated) DEVICE — SPNG LAP 18X18IN LF STRL PK/5

## (undated) DEVICE — SYR CT MUL PK 200ML HANDIFILL

## (undated) DEVICE — BLD CLIP UNIV SURG GRY

## (undated) DEVICE — BNDG ADHS FABRC 1X3IN LF

## (undated) DEVICE — SYR LUERLOK 30CC

## (undated) DEVICE — WRAP KNEE COLD THERAPY

## (undated) DEVICE — SYR LUERLOK 20CC

## (undated) DEVICE — SYS SKIN CLS DERMABOND PRINEO W/22CM MESH TP

## (undated) DEVICE — SUT VIC 2/0 CT1 27IN J259H

## (undated) DEVICE — SHEET,DRAPE,70X100,STERILE: Brand: MEDLINE

## (undated) DEVICE — MARKR UTIL W/RULR W/LBL REGTP STRL

## (undated) DEVICE — GLV SURG TRIUMPH ORTHO W/ALOE PF LTX 8 STRL

## (undated) DEVICE — GLV SURG SENSICARE W/ALOE PF LF 8 STRL

## (undated) DEVICE — PK KN TOTL 20

## (undated) DEVICE — DRSNG WND BORDR/ADHS NONADHR/GZ LF 4X10IN STRL

## (undated) DEVICE — PLUS HANDPIECE WITH MULTIPLE-ORIFICE TIP WITH SOFT CONE SPLASH SHIELD: Brand: SURGILAV

## (undated) DEVICE — BOWL AND CEMENT CARTRIDGE WITH BREAKAWAY FEMORAL NOZZLE AND MEDIUM PRESSURIZER: Brand: ACM

## (undated) DEVICE — VIOLET BRAIDED (POLYGLACTIN 910), SYNTHETIC ABSORBABLE SUTURE: Brand: COATED VICRYL

## (undated) DEVICE — CUFF SCD HEMOFORCE SEQ CALF STD MD

## (undated) DEVICE — DISPOSABLE TOURNIQUET CUFF SINGLE BLADDER, SINGLE PORT AND QUICK CONNECT CONNECTOR: Brand: COLOR CUFF

## (undated) DEVICE — NDL HYPO ECLPS SFTY 18G 1 1/2IN

## (undated) DEVICE — FLEXIBLE YANKAUER,MEDIUM TIP, NO VACUUM CONTROL: Brand: ARGYLE

## (undated) DEVICE — SYS SKIN CLS DERMABOND PRINEO W/60CM MESH TP

## (undated) DEVICE — DEV TISS CONTRL SUT STRATAFIX SPIRAL PGAPCL 3/0 PSL 60CM

## (undated) DEVICE — PAD SKINPREP IOD PVP MD

## (undated) DEVICE — 2108 SERIES SAGITTAL BLADE, NO OFFSET  (24.8 X 1.24 X 80.1MM)